# Patient Record
Sex: MALE | Race: WHITE | Employment: OTHER | URBAN - METROPOLITAN AREA
[De-identification: names, ages, dates, MRNs, and addresses within clinical notes are randomized per-mention and may not be internally consistent; named-entity substitution may affect disease eponyms.]

---

## 2017-01-11 DIAGNOSIS — N40.0 BPH (BENIGN PROSTATIC HYPERPLASIA): Primary | ICD-10-CM

## 2017-01-12 ENCOUNTER — OFFICE VISIT (OUTPATIENT)
Dept: UROLOGY | Facility: CLINIC | Age: 67
End: 2017-01-12
Payer: COMMERCIAL

## 2017-01-12 VITALS — HEART RATE: 63 BPM | BODY MASS INDEX: 31.98 KG/M2 | WEIGHT: 212 LBS | OXYGEN SATURATION: 97 %

## 2017-01-12 DIAGNOSIS — N40.1 BENIGN NODULAR PROSTATIC HYPERPLASIA WITH LOWER URINARY TRACT SYMPTOMS: ICD-10-CM

## 2017-01-12 LAB
ALBUMIN UR-MCNC: 30 MG/DL
APPEARANCE UR: CLEAR
BILIRUB UR QL STRIP: NEGATIVE
COLOR UR AUTO: YELLOW
GLUCOSE UR STRIP-MCNC: NEGATIVE MG/DL
HGB UR QL STRIP: ABNORMAL
KETONES UR STRIP-MCNC: NEGATIVE MG/DL
LEUKOCYTE ESTERASE UR QL STRIP: NEGATIVE
NITRATE UR QL: NEGATIVE
PH UR STRIP: 5.5 PH (ref 5–7)
PSA SERPL-MCNC: 2.4 NG/ML (ref 0–4)
SP GR UR STRIP: 1.01 (ref 1–1.03)
URN SPEC COLLECT METH UR: ABNORMAL
UROBILINOGEN UR STRIP-ACNC: 0.2 EU/DL (ref 0.2–1)

## 2017-01-12 PROCEDURE — 99213 OFFICE O/P EST LOW 20 MIN: CPT | Performed by: UROLOGY

## 2017-01-12 PROCEDURE — 36415 COLL VENOUS BLD VENIPUNCTURE: CPT | Performed by: UROLOGY

## 2017-01-12 PROCEDURE — 84153 ASSAY OF PSA TOTAL: CPT | Performed by: UROLOGY

## 2017-01-12 PROCEDURE — 81003 URINALYSIS AUTO W/O SCOPE: CPT | Performed by: UROLOGY

## 2017-01-12 ASSESSMENT — PAIN SCALES - GENERAL: PAINLEVEL: NO PAIN (0)

## 2017-01-12 NOTE — Clinical Note
1/12/2017      RE: Rhett Juares  [address] [facility] [address]       History: it is a very pleasant to see this very pleasant 66-year-old gentleman in follow-up consultation today.  He has a history of BPH with obstructive symptoms,type 1 diabetes,with some resultant neurovesical dysfunction with postvoid residuals in the range of 150 cc.  Once again today the residual volume is 148 cc.  He also has renal insufficiency and is being followed by her nephrologist.  He had required self intermittent catheterization in the past but no longer requires this  pSA in 2014 was 1.9,in 2015 1.6 in 2061.6 and in 2017,  2.4  He has no other major symptomatic complaints at this time.  His general health is otherwise satisfactory.    Past Medical History   Diagnosis Date     Hypertension      Diabetes mellitus (H)      CAD (coronary artery disease) 9/2012     NIECY to posterolateral branch     Pure hypercholesterolemia      Hyperlipidaemia      Lipoprotein deficiencies      S/P coronary artery stent placement 12/3/2014     Tremor 12/3/2014     Chronic kidney disease 7/6/2015     Gout        Social History     Social History     Marital Status:      Spouse Name: N/A     Number of Children: N/A     Years of Education: N/A     Social History Main Topics     Smoking status: Never Smoker      Smokeless tobacco: Never Used     Alcohol Use: 0.0 oz/week     0 Standard drinks or equivalent per week      Comment: occasional beer     Drug Use: No     Sexual Activity: Not Asked      Comment: not assessed     Other Topics Concern     Parent/Sibling W/ Cabg, Mi Or Angioplasty Before 65f 55m? 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            FH: family history includes Heart in his mother; Neurological in his father and father.       Past Surgical History   Procedure Laterality Date     Heart cath, angioplasty  9/14/12     second posterolateral branch off the dominant right coronary artery,       Family History   Problem Relation Age of Onset     HEART DISEASE Mother      Neurologic Disorder Father      Tremors at age of 70     Neurologic Disorder Father      Seizures         Current outpatient prescriptions:      co-enzyme Q-10 30 MG/5ML LIQD, Take by mouth daily, Disp: , Rfl:      atorvastatin (LIPITOR) 80 MG tablet, Take 1 tablet (80 mg) by mouth daily (Patient taking differently: Take 40 mg by mouth daily ), Disp: 90 tablet, Rfl: 3     venlafaxine (EFFEXOR-XR) 150 MG 24 hr capsule, Take 150 mg by mouth daily, Disp: , Rfl:      QUEtiapine (SEROQUEL) 100 MG tablet, Take 100 mg by mouth daily, Disp: , Rfl:      lisinopril (PRINIVIL,ZESTRIL) 20 MG tablet, Take 20 mg by mouth daily, Disp: , Rfl:      metoprolol (TOPROL-XL) 50 MG 24 hr tablet, Take 50 mg by mouth daily, Disp: , Rfl:      allopurinol (ZYLOPRIM) 100 MG tablet, Take 100 mg by mouth daily, Disp: , Rfl:      hydrochlorothiazide (HYDRODIURIL) 25 MG tablet, Take 1 tablet by mouth daily., Disp: 90 tablet, Rfl: 2     ASPIRIN PO, Take 81 mg by mouth daily , Disp: , Rfl:      CALCITRIOL PO, Take 0.25 mcg by mouth three times a week.  , Disp: , Rfl:      Cholecalciferol (VITAMIN D3 PO), Take 2,000 Units by mouth daily , Disp: , Rfl:     10 point ROS of systems including Constitutional, Eyes, Respiratory, Cardiovascular, Gastroenterology, Genitourinary, Integumentary, Muscularskeletal, Psychiatric were all negative except for pertinent positives noted in my HPI.    Examination:   Pulse 63  Wt 96.163 kg (212 lb)  SpO2 97%  General Impression: very pleasant gentleman in no acute  distress, well-oriented in time place and person  Mental Status: ormal.  HEENT.  There is no evidence of jaundice and mucous membranes are normal  Skin: the skin is normal to examination  Respiratory System: the respiratory cycle is normal  Lymph Nodes: not examined  Back/Flank Tenderness: there is no flank tenderness  Cardiovascular System: not examined  Abdominal Examination: not examined  Extremities: there is no significant peripheral edema  Genitial: not examined  Rectal Examination: ood sphincter tone, normal perianal sensation.  Smooth rectal mucosa without hemorrhoids or fissures.  Smooth softnormal size prostate without evidence of tenderness, bogginess or nodules.  Seminal vesicles.  Not palpable  Neurologic System: there are no new clinical focal abnormal neurological signs in the central or peripheral nervous systems    Impression: his overall situation is very stable.  The PSA is stable.  The clinical examination is satisfactory.  The postvoid residuals have been stable and he has few complaints.For this reason I do not see any indication for any change at this point.  i have recommended he contact me straightaway should he observe gross hematuria or any other significant deterioration in his symptoms.  i do explained and discussed the entire situation to the patient in detail today.  I answered all his questions    Plan: 1 year for PSA, bladder scan, symptom score and examination    Time: 0 minutes.  Greater than 50% was spent in discussion and consultation      Juan Carlos Banks MD

## 2017-01-12 NOTE — NURSING NOTE
Pt states no changes since he last saw you. No better and no worse.  OGT=[id number]  D. IZAIAH Alexander

## 2017-01-12 NOTE — PROGRESS NOTES
History: it is a very pleasant to see this very pleasant 66-year-old gentleman in follow-up consultation today.  He has a history of BPH with obstructive symptoms,type 1 diabetes,with some resultant neurovesical dysfunction with postvoid residuals in the range of 150 cc.  Once again today the residual volume is 148 cc.  He also has renal insufficiency and is being followed by her nephrologist.  He had required self intermittent catheterization in the past but no longer requires this  pSA in 2014 was 1.9,in 2015 1.6 in 2061.6 and in 2017,  2.4  He has no other major symptomatic complaints at this time.  His general health is otherwise satisfactory.    Past Medical History   Diagnosis Date     Hypertension      Diabetes mellitus (H)      CAD (coronary artery disease) 9/2012     NIECY to posterolateral branch     Pure hypercholesterolemia      Hyperlipidaemia      Lipoprotein deficiencies      S/P coronary artery stent placement 12/3/2014     Tremor 12/3/2014     Chronic kidney disease 7/6/2015     Gout        Social History     Social History     Marital Status:      Spouse Name: N/A     Number of Children: N/A     Years of Education: N/A     Social History Main Topics     Smoking status: Never Smoker      Smokeless tobacco: Never Used     Alcohol Use: 0.0 oz/week     0 Standard drinks or equivalent per week      Comment: occasional beer     Drug Use: No     Sexual Activity: Not Asked      Comment: not assessed     Other Topics Concern     Parent/Sibling W/ Cabg, Mi Or Angioplasty Before 65f 55m? 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            FH: family history includes Heart in his mother; Neurological in his father and father.       Past Surgical History   Procedure Laterality Date     Heart cath, angioplasty  9/14/12     second posterolateral branch off the dominant right coronary artery,       Family History   Problem Relation Age of Onset     HEART DISEASE Mother      Neurologic Disorder Father      Tremors at age of 70     Neurologic Disorder Father      Seizures         Current outpatient prescriptions:      co-enzyme Q-10 30 MG/5ML LIQD, Take by mouth daily, Disp: , Rfl:      atorvastatin (LIPITOR) 80 MG tablet, Take 1 tablet (80 mg) by mouth daily (Patient taking differently: Take 40 mg by mouth daily ), Disp: 90 tablet, Rfl: 3     venlafaxine (EFFEXOR-XR) 150 MG 24 hr capsule, Take 150 mg by mouth daily, Disp: , Rfl:      QUEtiapine (SEROQUEL) 100 MG tablet, Take 100 mg by mouth daily, Disp: , Rfl:      lisinopril (PRINIVIL,ZESTRIL) 20 MG tablet, Take 20 mg by mouth daily, Disp: , Rfl:      metoprolol (TOPROL-XL) 50 MG 24 hr tablet, Take 50 mg by mouth daily, Disp: , Rfl:      allopurinol (ZYLOPRIM) 100 MG tablet, Take 100 mg by mouth daily, Disp: , Rfl:      hydrochlorothiazide (HYDRODIURIL) 25 MG tablet, Take 1 tablet by mouth daily., Disp: 90 tablet, Rfl: 2     ASPIRIN PO, Take 81 mg by mouth daily , Disp: , Rfl:      CALCITRIOL PO, Take 0.25 mcg by mouth three times a week.  , Disp: , Rfl:      Cholecalciferol (VITAMIN D3 PO), Take 2,000 Units by mouth daily , Disp: , Rfl:     10 point ROS of systems including Constitutional, Eyes, Respiratory, Cardiovascular, Gastroenterology, Genitourinary, Integumentary, Muscularskeletal, Psychiatric were all negative except for pertinent positives noted in my HPI.    Examination:   Pulse 63  Wt 96.163 kg (212 lb)  SpO2 97%  General Impression: very pleasant gentleman in no acute distress, well-oriented in time place and person  Mental Status: ormal.  HEENT.  There is  no evidence of jaundice and mucous membranes are normal  Skin: the skin is normal to examination  Respiratory System: the respiratory cycle is normal  Lymph Nodes: not examined  Back/Flank Tenderness: there is no flank tenderness  Cardiovascular System: not examined  Abdominal Examination: not examined  Extremities: there is no significant peripheral edema  Genitial: not examined  Rectal Examination: ood sphincter tone, normal perianal sensation.  Smooth rectal mucosa without hemorrhoids or fissures.  Smooth softnormal size prostate without evidence of tenderness, bogginess or nodules.  Seminal vesicles.  Not palpable  Neurologic System: there are no new clinical focal abnormal neurological signs in the central or peripheral nervous systems    Impression: his overall situation is very stable.  The PSA is stable.  The clinical examination is satisfactory.  The postvoid residuals have been stable and he has few complaints.For this reason I do not see any indication for any change at this point.  i have recommended he contact me straightaway should he observe gross hematuria or any other significant deterioration in his symptoms.  i do explained and discussed the entire situation to the patient in detail today.  I answered all his questions    Plan: 1 year for PSA, bladder scan, symptom score and examination    Time: 0 minutes.  Greater than 50% was spent in discussion and consultation

## 2017-02-10 LAB
BUN SERPL-MCNC: 44 MG/DL (ref 7–25)
BUN/CREATININE RATIO (EXTERNAL): 21 (ref 6–22)
CALCIUM (EXTERNAL): 9.6 (ref 8.6–10.3)
CHLORIDE (EXTERNAL): 101 (ref 98–110)
CO2 SERPL-SCNC: 27 MMOL/L (ref 20–31)
CREATININE (EXTERNAL): 2.1 (ref 0.7–1.25)
EGFR CALCULATED (BLACK REFERENCE): 37 ML/MIN
EGFR CALCULATED (NON BLACK REFERENCE): 32 ML/MIN
GLUCOSE (EXTERNAL): 153 (ref 65–99)
GLUCOSE SERPL-MCNC: 139 MG/DL (ref 65–99)
HBA1C MFR BLD: 8.5 % (ref 4–6)
POTASSIUM (EXTERNAL): 4.7 (ref 3.5–5.3)
SODIUM (EXTERNAL): 138 (ref 135–146)

## 2017-02-21 NOTE — TELEPHONE ENCOUNTER
Received refill req for Lipitor 80 mg QD. This is what our med list states but there is a note next to it stating the pt is only taking 40 mg QD. Left msg for pt to call

## 2017-02-23 ENCOUNTER — TELEPHONE (OUTPATIENT)
Dept: CARDIOLOGY | Facility: CLINIC | Age: 67
End: 2017-02-23

## 2017-02-23 DIAGNOSIS — E78.5 HYPERLIPIDEMIA LDL GOAL <100: ICD-10-CM

## 2017-02-23 RX ORDER — ATORVASTATIN CALCIUM 80 MG/1
40 TABLET, FILM COATED ORAL DAILY
Qty: 45 TABLET | Refills: 2 | Status: SHIPPED | OUTPATIENT
Start: 2017-02-23 | End: 2017-12-22

## 2017-02-23 NOTE — TELEPHONE ENCOUNTER
Pt calling for refill on atorvastatin. Records show pt was originally on Crestor 40mg daily. Pt called Dr Florian's nurse on 1/24/16 because at that time he had been seen by Dr Florian. Crestor was no longer covered on his insurance. He was changed to Atorvastatin 80mg, but was to start on 40mg daily x 2 months and then recheck lipids and then increase to 80mg if tolerated. Pt never rechecked his lipids and per his report has stayed on the 40mg daily.     Pt has seen a different provider every year. This year for annual, pt saw Dr Ryan with [facility] on 10/24/16. Pt was instructed to continue the atorvastatin. Will review with Dr Ryan to see if he should stay on the 40mg daily. Last LDL was 35.

## 2017-02-23 NOTE — TELEPHONE ENCOUNTER
Lipid level, atorvastatin and follow up  Received: Today         Ajay Ryan MD Charles, Danielle, RN Hi Danielle     He should continue on 40 daily. Current  data does not suggest a downside, and possibly a benefit to LDL being as low as possible with documented CAD.     He is 's patient according to my note. He can get an appointment with Frederic... It is not crucial that it be exactly at one year intervals and as long as he is stable, can wait several months until Frederic is available.

## 2017-02-23 NOTE — TELEPHONE ENCOUNTER
Pt should stay on atorvastatin 40mg daily. Future follow-ups need to stay with Dr De La Garza for continuation of care.      Lipid level, atorvastatin and follow up  Received: Today       Ajay Ryan MD Charles, Danielle, RN Hi Danielle     He should continue on 40 daily. Current  data does not suggest a downside, and possibly a benefit to LDL being as low as possible with documented CAD.     He is 's patient according to my note. He can get an appointment with Frederic... It is not crucial that it be exactly at one year intervals and as long as he is stable, can wait several months until Frederic is available.

## 2017-03-09 ENCOUNTER — HOSPITAL ENCOUNTER (OUTPATIENT)
Dept: ULTRASOUND IMAGING | Facility: CLINIC | Age: 67
Discharge: HOME | End: 2017-03-09
Attending: INTERNAL MEDICINE | Admitting: INTERNAL MEDICINE
Payer: MEDICARE

## 2017-03-09 DIAGNOSIS — N18.9 CKD (CHRONIC KIDNEY DISEASE), UNSPECIFIED STAGE: ICD-10-CM

## 2017-03-09 PROCEDURE — 76770 US EXAM ABDO BACK WALL COMP: CPT

## 2017-08-08 LAB
ANGIOTEN CONV ENZYME: 7.7 IU/L (ref 4–6)
GLUCOSE SERPL-MCNC: 155 MG/DL (ref 65–99)
TSH SERPL-ACNC: 4.34 MCU/ML (ref 0.3–5)

## 2017-08-09 LAB
BUN/CREATININE RATIO (EXTERNAL): 25 (ref 6–22)
BUN/CREATININE RATIO (EXTERNAL): 47 (ref 7–25)
CALCIUM (EXTERNAL): 9.4 (ref 8.6–10.3)
CHLORIDE (EXTERNAL): 102 (ref 98–110)
CO2 SERPL-SCNC: 26 MMOL/L (ref 20–31)
CREAT SERPL-MCNC: 1.86 MG/DL (ref 0.7–1.25)
GFR SERPL CREATININE-BSD FRML MDRD: 37 ML/MIN/1.73M2
GLUCOSE SERPL-MCNC: 167 MG/DL (ref 65–99)
POTASSIUM (EXTERNAL): 4.6 (ref 3.5–5.3)
SODIUM SERPL-SCNC: 138 MMOL/L (ref 135–146)

## 2017-09-18 ENCOUNTER — TRANSFERRED RECORDS (OUTPATIENT)
Dept: HEALTH INFORMATION MANAGEMENT | Facility: CLINIC | Age: 67
End: 2017-09-18

## 2017-12-08 ENCOUNTER — TRANSFERRED RECORDS (OUTPATIENT)
Dept: HEALTH INFORMATION MANAGEMENT | Facility: CLINIC | Age: 67
End: 2017-12-08

## 2017-12-08 ENCOUNTER — PRE VISIT (OUTPATIENT)
Dept: CARDIOLOGY | Facility: CLINIC | Age: 67
End: 2017-12-08

## 2017-12-11 ENCOUNTER — DOCUMENTATION ONLY (OUTPATIENT)
Dept: CARDIOLOGY | Facility: CLINIC | Age: 67
End: 2017-12-11

## 2017-12-14 ENCOUNTER — OFFICE VISIT (OUTPATIENT)
Dept: CARDIOLOGY | Facility: CLINIC | Age: 67
End: 2017-12-14
Attending: INTERNAL MEDICINE
Payer: COMMERCIAL

## 2017-12-14 VITALS
BODY MASS INDEX: 31.98 KG/M2 | HEIGHT: 68 IN | DIASTOLIC BLOOD PRESSURE: 60 MMHG | SYSTOLIC BLOOD PRESSURE: 128 MMHG | WEIGHT: 211 LBS | HEART RATE: 62 BPM

## 2017-12-14 DIAGNOSIS — N18.30 STAGE 3 CHRONIC KIDNEY DISEASE (H): Primary | Chronic | ICD-10-CM

## 2017-12-14 DIAGNOSIS — I25.10 CORONARY ARTERY DISEASE INVOLVING NATIVE CORONARY ARTERY OF NATIVE HEART WITHOUT ANGINA PECTORIS: ICD-10-CM

## 2017-12-14 DIAGNOSIS — E78.2 MIXED HYPERLIPIDEMIA: ICD-10-CM

## 2017-12-14 DIAGNOSIS — E78.6 HDL DEFICIENCY: ICD-10-CM

## 2017-12-14 PROCEDURE — 99213 OFFICE O/P EST LOW 20 MIN: CPT | Performed by: INTERNAL MEDICINE

## 2017-12-14 RX ORDER — OMEGA-3-ACID ETHYL ESTERS 1 G/1
2 CAPSULE, LIQUID FILLED ORAL
COMMUNITY
End: 2018-01-30

## 2017-12-14 NOTE — MR AVS SNAPSHOT
"              After Visit Summary   12/14/2017    Rhett Juares    MRN: [id number]           Patient Information     Date Of Birth          1950        Visit Information        Provider Department      12/14/2017 10:00 AM Kd De La Garza MD [facility]        Today's Diagnoses     Pure hypercholesterolemia        Coronary artery disease involving native coronary artery of native heart without angina pectoris        Elevated blood pressure reading without diagnosis of hypertension           Follow-ups after your visit        Your next 10 appointments already scheduled     Jan 30, 2018  1:00 PM CST   Return Visit with Juan Carlos Banks MD   [facility] ([facility])    [address]  [address]  [facility] [place] [address] [phone]              Who to contact     If you have questions or need follow up information about today's clinic visit or your schedule please contact [facility] directly at [phone].  Normal or non-critical lab and imaging results will be communicated to you by Picovicohart, letter or phone within 4 business days after the clinic has received the results. If you do not hear from us within 7 days, please contact the clinic through [employer] or phone. If you have a critical or abnormal lab result, we will notify you by phone as soon as possible.  Submit refill requests through Fanvibe or call your pharmacy and they will forward the refill request to us. Please allow 3 business days for your refill to be completed.          Additional Information About Your Visit        Picovicohart Information     Fanvibe lets you send messages to your doctor, view your test results, renew your prescriptions, schedule appointments and more. To sign up, go to [email or web address] . Click on \"Log in\" on the left side of the screen, which will take you to the " "Welcome page. Then click on \"Sign up Now\" on the right side of the page.     You will be asked to enter the access code listed below, as well as some personal information. Please follow the directions to create your username and password.     Your access code is: [email or web address]  Expires: 3/14/2018 10:51 AM     Your access code will  in 90 days. If you need help or a new code, please call your [place] [facility] clinic or [phone].        Care EveryWhere ID     This is your Care EveryWhere ID. This could be used by other organizations to access your [facility] medical records  [id number]        Your Vitals Were     Pulse Height BMI (Body Mass Index)             62 1.734 m (5' 8.25\") 31.85 kg/m2          Blood Pressure from Last 3 Encounters:   17 128/60   10/24/16 140/56   10/13/15 124/76    Weight from Last 3 Encounters:   17 95.7 kg (211 lb)   17 96.2 kg (212 lb)   10/24/16 97.6 kg (215 lb 3.2 oz)              We Performed the Following     Follow-Up with Cardiologist          Today's Medication Changes          These changes are accurate as of: 17 10:51 AM.  If you have any questions, ask your nurse or doctor.               Stop taking these medicines if you haven't already. Please contact your care team if you have questions.     co-enzyme Q-10 30 MG/5ML Liqd liquid   Stopped by:  Kd De La Garza MD                    Primary Care Provider Office Phone # Fax #    Radha Kevin Joseph [phone]       [facility] [address] [place] [place] [address]        Equal Access to Services     [facility] AH: Hadvelasquez Linda, eben vidales, maged junealmarehana mccauley, amy amaral. So [facility] [phone].    ATENCIÓN: Si habla español, tiene a gonzalez disposición servicios gratuitos de asistencia lingüística. Llame al [phone].    We comply with applicable federal civil rights laws and [place] laws. We do not " discriminate on the basis of race, color, national origin, age, disability, sex, sexual orientation, or gender identity.            Thank you!     Thank you for choosing [facility]  for your care. Our goal is always to provide you with excellent care. Hearing back from our patients is one way we can continue to improve our services. Please take a few minutes to complete the written survey that you may receive in the mail after your visit with us. Thank you!             Your Updated Medication List - Protect others around you: Learn how to safely use, store and throw away your medicines at www.disposemymeds.org.          This list is accurate as of: 12/14/17 10:51 AM.  Always use your most recent med list.                   Brand Name Dispense Instructions for use Diagnosis    allopurinol 100 MG tablet    ZYLOPRIM     Take 100 mg by mouth daily        ASPIRIN PO      Take 81 mg by mouth daily        atorvastatin 80 MG tablet    LIPITOR    45 tablet    Take 0.5 tablets (40 mg) by mouth daily    Hyperlipidemia LDL goal <100       CALCITRIOL PO      Take 0.25 mcg by mouth three times a week.        hydrochlorothiazide 25 MG tablet    HYDRODIURIL    90 tablet    Take 1 tablet by mouth daily.    Unspecified essential hypertension       lisinopril 20 MG tablet    PRINIVIL/ZESTRIL     Take 20 mg by mouth daily        metoprolol 50 MG 24 hr tablet    TOPROL-XL     Take 50 mg by mouth daily        omega-3 acid ethyl esters 1 G capsule    Lovaza     Take 2 g by mouth Fish oil 1000 mg daily        QUEtiapine 100 MG tablet    SEROquel     Take 100 mg by mouth daily        venlafaxine 150 MG 24 hr capsule    EFFEXOR-XR     Take 150 mg by mouth daily        VITAMIN D3 PO      Take 2,000 Units by mouth daily

## 2017-12-14 NOTE — PROGRESS NOTES
HISTORY OF PRESENT ILLNESS:  Mr. Juares is a 67-year-old gentleman with a past medical history significant for juvenile onset diabetes mellitus since age 19, coronary artery disease, hypertension, mixed hyperlipidemia with HDL deficiency and hypertriglyceridemia and chronic renal insufficiency, stage III.      I had seen Mr. Juares in clinic once in 2012 at which time he was seen in followup for a stress echocardiogram that was positive with inferior wall ischemia and inducible nonsustained ventricular tachycardia.  He went on to cardiac catheterization where I placed a 2.25 x 22 mm drug-eluting stent in his second posterolateral branch.  This resulted in complete resolution of his symptoms.  He has done well ever since.  He has bounced around amongst multiple cardiologists over the years and now I am seeing him back for the first time in 5 years.      Rhett returns to clinic stating he is doing quite well.  He has no chest, arm, neck, jaw or shoulder discomfort.  No dyspnea on exertion, orthopnea or PND, no palpitations, lightheadedness, dizziness, syncope or near-syncope.  He notes no side effects or problems with his current medical regimen.  He states he goes to the club 4 times a week and does aerobic activity.  He states twice a week and will do some resistance activity and also goes to yoga class.  None of these cause him any problems.      He does follow with Dr. Cuba for his renal insufficiency.      ASSESSMENT AND PLAN:   Rhett appears to be doing quite well from a cardiac standpoint without clinical evidence of ischemia.  Last evaluation of his coronary anatomy was a stress echo performed in 10/2012 which was technically difficult study for which he only achieved 82% of his maximal heart rate due to being on beta blockers.  There is no evidence of ischemia at this workload.      He has no symptoms to suggest heart failure or significant arrhythmias.  There was no ventricular ectopy with a followup  stress echocardiogram.      I congratulated him on his exercise regimen and encouraged him to continue to do so.      Blood pressure is very well controlled at 128/60 with a pulse of 62.      Weight is 211 which does put him in the obese category with a body mass index of 31.9 with clothes on and I have encouraged him to try to lose some weight.  This will help with his diabetes, cholesterol profile and overall cardiovascular fitness.      Cholesterol profile was last checked a year ago and was outstanding.  Total cholesterol is 103, HDL is 37, LDL is 35, triglycerides are 155.  We are going to check a cholesterol profile.  He states he is due to follow up with Dr. Guajardo for his diabetes and I have recommended he get a fasting lipid profile at that time.      Last BMP was in August at which time his creatinine was 1.86, giving him a GFR of 37.  As stated, he follows closely with Dr. Cuba.      My recommendation is that he follow up with us in 1 year.  I will have him see my GINA.  We will check a fasting lipid profile and BMP if he has not had one done elsewhere.  If he does develop any chest, arm, neck, jaw or shoulder discomfort or palpitations that he needs to call us and we will evaluate with either stress testing or coronary angiography as appropriate.  Otherwise, we will continue to follow him on an annual basis.  I will continue his current medical regimen as is.      Thank you for allowing me to participate in his care.         AMARILYS CAMPOS MD, [facility]             D: 2017 11:41   T: 2017 12:12   MT: [id number]      Name:     TIFFANY LUCEOR   MRN:      [id number]        Account:      [id number]   :      1950           Service Date: 2017      Document: [id number]

## 2017-12-14 NOTE — PROGRESS NOTES
HPI and Plan:   See dictation    Orders Placed This Encounter   Procedures     Basic metabolic panel     Lipid Profile     Follow-Up with Cardiac Advanced Practice Provider     Follow-Up with Cardiologist       Orders Placed This Encounter   Medications     omega-3 acid ethyl esters (LOVAZA) 1 G capsule     Sig: Take 2 g by mouth Fish oil 1000 mg daily       Medications Discontinued During This Encounter   Medication Reason     co-enzyme Q-10 30 MG/5ML LIQD          Encounter Diagnoses   Name Primary?     Coronary artery disease involving native coronary artery of native heart without angina pectoris      Stage 3 chronic kidney disease Yes     HDL deficiency      Mixed hyperlipidemia        CURRENT MEDICATIONS:  Current Outpatient Prescriptions   Medication Sig Dispense Refill     omega-3 acid ethyl esters (LOVAZA) 1 G capsule Take 2 g by mouth Fish oil 1000 mg daily       atorvastatin (LIPITOR) 80 MG tablet Take 0.5 tablets (40 mg) by mouth daily 45 tablet 2     venlafaxine (EFFEXOR-XR) 150 MG 24 hr capsule Take 150 mg by mouth daily       QUEtiapine (SEROQUEL) 100 MG tablet Take 100 mg by mouth daily       lisinopril (PRINIVIL,ZESTRIL) 20 MG tablet Take 20 mg by mouth daily       metoprolol (TOPROL-XL) 50 MG 24 hr tablet Take 50 mg by mouth daily       allopurinol (ZYLOPRIM) 100 MG tablet Take 100 mg by mouth daily       hydrochlorothiazide (HYDRODIURIL) 25 MG tablet Take 1 tablet by mouth daily. 90 tablet 2     ASPIRIN PO Take 81 mg by mouth daily        CALCITRIOL PO Take 0.25 mcg by mouth three times a week.         Cholecalciferol (VITAMIN D3 PO) Take 2,000 Units by mouth daily          ALLERGIES     Allergies   Allergen Reactions     Carbidopa-Levodopa Nausea and Vomiting     Penicillin G        PAST MEDICAL HISTORY:  Past Medical History:   Diagnosis Date     Chronic kidney disease 07/06/2015    managed by Dr. Culver     Coronary artery disease involving native coronary artery of native heart without angina  pectoris 09/2012    2012 - NIECY to posterolateral branch      Diabetes mellitus (H)     insulin pump, managed by Dr. Bennett Ortiz      Hypertension      Lipoprotein deficiencies      Parkinsonism (H) 12/4/2014     Pure hypercholesterolemia      S/P coronary artery stent placement 12/3/2014     Tremor 12/3/2014       PAST SURGICAL HISTORY:  Past Surgical History:   Procedure Laterality Date     HEART CATH, ANGIOPLASTY  9/14/12    second posterolateral branch off the dominant right coronary artery,       FAMILY HISTORY:  Family History   Problem Relation Age of Onset     HEART DISEASE Mother      Neurologic Disorder Father      Tremors at age of 70/Seizures       SOCIAL HISTORY:  Social History     Social History     Marital status:      Spouse name: N/A     Number of children: N/A     Years of education: N/A     Social History Main Topics     Smoking status: Never Smoker     Smokeless tobacco: Never Used     Alcohol use 0.0 oz/week     0 Standard drinks or equivalent per week      Comment: occasional beer     Drug use: No     Sexual activity: Not Asked      Comment: not assessed     Other Topics Concern     Parent/Sibling W/ Cabg, Mi Or Angioplasty Before 65f 55m? 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            FH: family history includes Heart in his mother; Neurological in his father and father.       Review of Systems:  Skin:  Negative for       Eyes:  Positive for glasses    ENT:  Negative for      Respiratory:  Positive for sleep apnea;CPAP     Cardiovascular:    Positive for left ankle - always has  "edema  Gastroenterology: Positive for heartburn;constipation    Genitourinary:  not assessed      Musculoskeletal:  Positive for back pain    Neurologic:    numbness or tingling of hands    Psychiatric:  Negative for      Heme/Lymph/Imm:  Negative for      Endocrine:  Positive for diabetes      Physical Exam:  Vitals: /60  Pulse 62  Ht 1.734 m (5' 8.25\")  Wt 95.7 kg (211 lb)  BMI 31.85 kg/m2    Constitutional:  cooperative, alert and oriented, well developed, well nourished, in no acute distress overweight      Skin:  warm and dry to the touch, no apparent skin lesions or masses noted          Head:  normocephalic, no masses or lesions        Eyes:  pupils equal and round, conjunctivae and lids unremarkable, sclera white, no xanthalasma, EOMS intact, no nystagmus        Lymph:      ENT:  no pallor or cyanosis, dentition good        Neck:           Respiratory:  normal breath sounds, clear to auscultation, normal A-P diameter, normal symmetry, normal respiratory excursion, no use of accessory muscles         Cardiac: regular rhythm, normal S1/S2, no S3 or S4, apical impulse not displaced, no murmurs, gallops or rubs                pulses full and equal, no bruits auscultated                                        GI:  abdomen soft, non-tender, BS normoactive, no mass, no HSM, no bruits        Extremities and Muscular Skeletal:  no edema;no spinal abnormalities noted;normal muscle strength and tone              Neurological:  no gross motor deficits        Psych:  affect appropriate, oriented to time, person and place        CC  Ajay Ryan MD  [address]  [place] [address]              "

## 2017-12-14 NOTE — LETTER
12/14/2017    Radha Joseph NP  [facility] [address] [place] [address]      RE: Rhett Juares       Dear Colleague,    I had the pleasure of seeing Rhett Juares in the [facility].    HISTORY OF PRESENT ILLNESS:  Mr. Juares is a 67-year-old gentleman with a past medical history significant for juvenile onset diabetes mellitus since age 19, coronary artery disease, hypertension, mixed hyperlipidemia with HDL deficiency and hypertriglyceridemia and chronic renal insufficiency, stage III.      I had seen Mr. Juares in clinic once in 2012 at which time he was seen in followup for a stress echocardiogram that was positive with inferior wall ischemia and inducible nonsustained ventricular tachycardia.  He went on to cardiac catheterization where I placed a 2.25 x 22 mm drug-eluting stent in his second posterolateral branch.  This resulted in complete resolution of his symptoms.  He has done well ever since.  He has bounced around amongst multiple cardiologists over the years and now I am seeing him back for the first time in 5 years.      Rhett returns to clinic stating he is doing quite well.  He has no chest, arm, neck, jaw or shoulder discomfort.  No dyspnea on exertion, orthopnea or PND, no palpitations, lightheadedness, dizziness, syncope or near-syncope.  He notes no side effects or problems with his current medical regimen.  He states he goes to the club 4 times a week and does aerobic activity.  He states twice a week and will do some resistance activity and also goes to yoga class.  None of these cause him any problems.      He does follow with Dr. Cuba for his renal insufficiency.      ASSESSMENT AND PLAN:   Rhett appears to be doing quite well from a cardiac standpoint without clinical evidence of ischemia.  Last evaluation of his coronary anatomy was a stress echo performed in 10/2012 which was technically  difficult study for which he only achieved 82% of his maximal heart rate due to being on beta blockers.  There is no evidence of ischemia at this workload.      He has no symptoms to suggest heart failure or significant arrhythmias.  There was no ventricular ectopy with a followup stress echocardiogram.      I congratulated him on his exercise regimen and encouraged him to continue to do so.      Blood pressure is very well controlled at 128/60 with a pulse of 62.      Weight is 211 which does put him in the obese category with a body mass index of 31.9 with clothes on and I have encouraged him to try to lose some weight.  This will help with his diabetes, cholesterol profile and overall cardiovascular fitness.      Cholesterol profile was last checked a year ago and was outstanding.  Total cholesterol is 103, HDL is 37, LDL is 35, triglycerides are 155.  We are going to check a cholesterol profile.  He states he is due to follow up with Dr. Guajardo for his diabetes and I have recommended he get a fasting lipid profile at that time.      Last BMP was in August at which time his creatinine was 1.86, giving him a GFR of 37.  As stated, he follows closely with Dr. Cuba.      My recommendation is that he follow up with us in 1 year.  I will have him see my GINA.  We will check a fasting lipid profile and BMP if he has not had one done elsewhere.  If he does develop any chest, arm, neck, jaw or shoulder discomfort or palpitations that he needs to call us and we will evaluate with either stress testing or coronary angiography as appropriate.  Otherwise, we will continue to follow him on an annual basis.  I will continue his current medical regimen as is.      Thank you for allowing me to participate in his care.       Outpatient Encounter Prescriptions as of 12/14/2017   Medication Sig Dispense Refill     omega-3 acid ethyl esters (LOVAZA) 1 G capsule Take 2 g by mouth Fish oil 1000 mg daily       atorvastatin (LIPITOR)  80 MG tablet Take 0.5 tablets (40 mg) by mouth daily 45 tablet 2     venlafaxine (EFFEXOR-XR) 150 MG 24 hr capsule Take 150 mg by mouth daily       QUEtiapine (SEROQUEL) 100 MG tablet Take 100 mg by mouth daily       lisinopril (PRINIVIL,ZESTRIL) 20 MG tablet Take 20 mg by mouth daily       metoprolol (TOPROL-XL) 50 MG 24 hr tablet Take 50 mg by mouth daily       allopurinol (ZYLOPRIM) 100 MG tablet Take 100 mg by mouth daily       hydrochlorothiazide (HYDRODIURIL) 25 MG tablet Take 1 tablet by mouth daily. 90 tablet 2     ASPIRIN PO Take 81 mg by mouth daily        CALCITRIOL PO Take 0.25 mcg by mouth three times a week.         Cholecalciferol (VITAMIN D3 PO) Take 2,000 Units by mouth daily        [DISCONTINUED] co-enzyme Q-10 30 MG/5ML LIQD Take by mouth daily       No facility-administered encounter medications on file as of 12/14/2017.        Again, thank you for allowing me to participate in the care of your patient.      Sincerely,    Kd De La Garza MD     [facility]

## 2017-12-22 DIAGNOSIS — E78.5 HYPERLIPIDEMIA LDL GOAL <100: ICD-10-CM

## 2017-12-22 RX ORDER — ATORVASTATIN CALCIUM 80 MG/1
40 TABLET, FILM COATED ORAL DAILY
Qty: 45 TABLET | Refills: 3 | Status: SHIPPED | OUTPATIENT
Start: 2017-12-22 | End: 2018-12-12

## 2018-01-30 ENCOUNTER — OFFICE VISIT (OUTPATIENT)
Dept: UROLOGY | Facility: CLINIC | Age: 68
End: 2018-01-30
Payer: COMMERCIAL

## 2018-01-30 VITALS
DIASTOLIC BLOOD PRESSURE: 60 MMHG | BODY MASS INDEX: 31.07 KG/M2 | HEART RATE: 59 BPM | SYSTOLIC BLOOD PRESSURE: 130 MMHG | HEIGHT: 68 IN | OXYGEN SATURATION: 99 % | WEIGHT: 205 LBS

## 2018-01-30 DIAGNOSIS — N40.1 BPH WITH URINARY OBSTRUCTION: Primary | ICD-10-CM

## 2018-01-30 DIAGNOSIS — N13.8 BPH WITH URINARY OBSTRUCTION: Primary | ICD-10-CM

## 2018-01-30 DIAGNOSIS — N40.1 BENIGN NODULAR PROSTATIC HYPERPLASIA WITH LOWER URINARY TRACT SYMPTOMS: ICD-10-CM

## 2018-01-30 LAB
ALBUMIN UR-MCNC: ABNORMAL MG/DL
APPEARANCE UR: CLEAR
BILIRUB UR QL STRIP: NEGATIVE
COLOR UR AUTO: YELLOW
GLUCOSE UR STRIP-MCNC: 250 MG/DL
HGB UR QL STRIP: NEGATIVE
KETONES UR STRIP-MCNC: NEGATIVE MG/DL
LEUKOCYTE ESTERASE UR QL STRIP: NEGATIVE
NITRATE UR QL: NEGATIVE
PH UR STRIP: 6.5 PH (ref 5–7)
PSA SERPL-MCNC: 2.6 NG/ML (ref 0–4)
RESIDUAL VOLUME (RV) (EXTERNAL): 211
SOURCE: ABNORMAL
SP GR UR STRIP: 1.01 (ref 1–1.03)
UROBILINOGEN UR STRIP-ACNC: 0.2 EU/DL (ref 0.2–1)

## 2018-01-30 PROCEDURE — 36415 COLL VENOUS BLD VENIPUNCTURE: CPT | Performed by: UROLOGY

## 2018-01-30 PROCEDURE — 51798 US URINE CAPACITY MEASURE: CPT | Performed by: UROLOGY

## 2018-01-30 PROCEDURE — 99213 OFFICE O/P EST LOW 20 MIN: CPT | Mod: 25 | Performed by: UROLOGY

## 2018-01-30 PROCEDURE — 81003 URINALYSIS AUTO W/O SCOPE: CPT | Performed by: UROLOGY

## 2018-01-30 PROCEDURE — 84153 ASSAY OF PSA TOTAL: CPT | Performed by: UROLOGY

## 2018-01-30 RX ORDER — CIDER VINEGAR 300 MG
1 TABLET ORAL
COMMUNITY

## 2018-01-30 ASSESSMENT — PAIN SCALES - GENERAL: PAINLEVEL: NO PAIN (0)

## 2018-01-30 NOTE — MR AVS SNAPSHOT
"              After Visit Summary   1/30/2018    Rhett Juares    MRN: [id number]           Patient Information     Date Of Birth          1950        Visit Information        Provider Department      1/30/2018 1:00 PM Juan Carlos Banks MD [facility]        Today's Diagnoses     Benign nodular prostatic hyperplasia with lower urinary tract symptoms           Follow-ups after your visit        Follow-up notes from your care team     Return if symptoms worsen or fail to improve.      Future tests that were ordered for you today     Open Future Orders        Priority Expected Expires Ordered    MEASURE POST-VOID RESIDUAL URINE/BLADDER CAPACITY, US NON-IMAGING (51973) Routine  1/30/2019 1/30/2018            Who to contact     If you have questions or need follow up information about today's clinic visit or your schedule please contact [facility]OLOG[facility] directly at [phone].  Normal or non-critical lab and imaging results will be communicated to you by MyChart, letter or phone within 4 business days after the clinic has received the results. If you do not hear from us within 7 days, please contact the clinic through MyChart or phone. If you have a critical or abnormal lab result, we will notify you by phone as soon as possible.  Submit refill requests through "[employer] or call your pharmacy and they will forward the refill request to us. Please allow 3 business days for your refill to be completed.          Additional Information About Your Visit        MyChart Information     "[employer] lets you send messages to your doctor, view your test results, renew your prescriptions, schedule appointments and more. To sign up, go to [email or web address] . Click on \"Log in\" on the left side of the screen, which will take you to the Welcome page. Then click on \"Sign up Now\" on the right side of the page.     You will be asked to enter " "the access code listed below, as well as some personal information. Please follow the directions to create your username and password.     Your access code is: [email or web address]  Expires: 3/14/2018 10:51 AM     Your access code will  in 90 days. If you need help or a new code, please call your [facility] clinic or [phone].        Care EveryWhere ID     This is your Care EveryWhere ID. This could be used by other organizations to access your [facility] medical records  [id number]        Your Vitals Were     Pulse Height Pulse Oximetry BMI (Body Mass Index)          59 1.727 m (5' 8\") 99% 31.17 kg/m2         Blood Pressure from Last 3 Encounters:   18 130/60   17 128/60   10/24/16 140/56    Weight from Last 3 Encounters:   18 93 kg (205 lb)   17 95.7 kg (211 lb)   17 96.2 kg (212 lb)              We Performed the Following     MEASURE POST-VOID RESIDUAL URINE/BLADDER CAPACITY, US NON-IMAGING (05775)     PSA Diag Urologic Phys [[id number]]     UA without Microscopic        Primary Care Provider Office Phone # Fax #    Radha Joseph [phone]       [place] [facility] [address] [place] [address]        Equal Access to Services     AMANDA ROJO : Hadii aad ku hadasho Soomaali, waaxda luqadaha, qaybta kaalmada adeegyada, amy amaral. So [facility] [phone].    ATENCIÓN: Si habla español, tiene a gonzalez disposición servicios gratuitos de asistencia lingüística. Lucia al [phone].    We comply with applicable federal civil rights laws and [place] laws. We do not discriminate on the basis of race, color, national origin, age, disability, sex, sexual orientation, or gender identity.            Thank you!     Thank you for choosing [facility]  for your care. Our goal is always to provide you with excellent care. Hearing back from our patients is one way we can continue to " improve our services. Please take a few minutes to complete the written survey that you may receive in the mail after your visit with us. Thank you!             Your Updated Medication List - Protect others around you: Learn how to safely use, store and throw away your medicines at www.disposemymeds.org.          This list is accurate as of 1/30/18  1:40 PM.  Always use your most recent med list.                   Brand Name Dispense Instructions for use Diagnosis    allopurinol 100 MG tablet    ZYLOPRIM     Take 100 mg by mouth daily        ASPIRIN PO      Take 81 mg by mouth daily        atorvastatin 80 MG tablet    LIPITOR    45 tablet    Take 0.5 tablets (40 mg) by mouth daily    Hyperlipidemia LDL goal <100       CALCITRIOL PO      Take 0.25 mcg by mouth three times a week.        Fish Oil 1000 MG Cpdr           hydrochlorothiazide 25 MG tablet    HYDRODIURIL    90 tablet    Take 1 tablet by mouth daily.    Unspecified essential hypertension       lisinopril 20 MG tablet    PRINIVIL/ZESTRIL     Take 20 mg by mouth daily        metoprolol succinate 50 MG 24 hr tablet    TOPROL-XL     Take 50 mg by mouth daily        QUEtiapine 100 MG tablet    SEROquel     Take 100 mg by mouth daily        venlafaxine 150 MG 24 hr capsule    EFFEXOR-XR     Take 150 mg by mouth daily        VITAMIN D3 PO      Take 2,000 Units by mouth daily

## 2018-01-30 NOTE — LETTER
1/30/2018       RE: Rhett Juares  [address] [address] [address]     Dear Colleague,    Thank you for referring your patient, Rhett Juares, to the [facility] [place] [facility]. Please see a copy of my visit note below.    History: It is a great pleasure to see this very pleasant 67-year-old gentleman in follow-up consultation today.  As we recall the has a history of type 1 diabetes, mild renal insufficiency, coronary artery disease and mild lower urinary tract symptoms.  Symptoms: There is 14.  Postvoid residual today is 231 cc.  However he has no major complaints at this time apart from double voiding first thing in the morning.  As been no history of urinary tract infection and no evidence of gross hematuria.  Renal functions remained stable over the last 5 years    Past Medical History:   Diagnosis Date     Chronic kidney disease 07/06/2015    managed by Dr. Culver     Coronary artery disease involving native coronary artery of native heart without angina pectoris 09/2012 2012 - NIECY to posterolateral branch      Diabetes mellitus (H)     insulin pump, managed by Dr. Guajardo     Gout      Hypertension      Lipoprotein deficiencies      Parkinsonism (H) 12/4/2014     Pure hypercholesterolemia      S/P coronary artery stent placement 12/3/2014     Tremor 12/3/2014       Social History     Social History     Marital status:      Spouse name: N/A     Number of children: N/A     Years of education: N/A     Social History Main Topics     Smoking status: Never Smoker     Smokeless tobacco: Never Used     Alcohol use 0.0 oz/week     0 Standard drinks or equivalent per week      Comment: occasional beer     Drug use: No     Sexual activity: Not Asked      Comment: not assessed     Other Topics Concern     Parent/Sibling W/ Cabg, Mi Or Angioplasty Before 65f 55m? 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            FH: family history includes Heart in his mother; Neurological in his father and father.       Past Surgical History:   Procedure Laterality Date     HEART CATH, ANGIOPLASTY  9/14/12    second posterolateral branch off the dominant right coronary artery,       Family History   Problem Relation Age of Onset     HEART DISEASE Mother      Neurologic Disorder Father      Tremors at age of 70/Seizures         Current Outpatient Prescriptions:      Omega-3 Fatty Acids (FISH OIL) 1000 MG CPDR, , Disp: , Rfl:      atorvastatin (LIPITOR) 80 MG tablet, Take 0.5 tablets (40 mg) by mouth daily, Disp: 45 tablet, Rfl: 3     venlafaxine (EFFEXOR-XR) 150 MG 24 hr capsule, Take 150 mg by mouth daily, Disp: , Rfl:      QUEtiapine (SEROQUEL) 100 MG tablet, Take 100 mg by mouth daily, Disp: , Rfl:      lisinopril (PRINIVIL,ZESTRIL) 20 MG tablet, Take 20 mg by mouth daily, Disp: , Rfl:      metoprolol (TOPROL-XL) 50 MG 24 hr tablet, Take 50 mg by mouth daily, Disp: , Rfl:      allopurinol (ZYLOPRIM) 100 MG tablet, Take 100 mg by mouth daily, Disp: , Rfl:      hydrochlorothiazide (HYDRODIURIL) 25 MG tablet, Take 1 tablet by mouth daily., Disp: 90 tablet, Rfl: 2     ASPIRIN PO, Take 81 mg by mouth daily , Disp: , Rfl:      CALCITRIOL PO, Take 0.25 mcg by mouth three times a week.  , Disp: , Rfl:      Cholecalciferol (VITAMIN D3 PO), Take 2,000 Units by mouth daily , Disp: , Rfl:     10 point ROS of systems including Constitutional, Eyes, Respiratory, Cardiovascular, Gastroenterology, Genitourinary, Integumentary, Muscularskeletal,  "Psychiatric were all negative except for pertinent positives noted in my HPI.    Examination:   /60  Pulse 59  Ht 1.727 m (5' 8\")  Wt 93 kg (205 lb)  SpO2 99%  BMI 31.17 kg/m2  General Impression: Very pleasant gentleman in no acute distress, well-oriented in time place and person  Mental Status: Normal.  HEENT.  There is no clinical evidence of jaundice and the mucous membranes are normal  Skin: His skin is otherwise normal to examination  Respiratory System: The respiratory cycle is normal  Lymph Nodes: Not examined  Back/Flank Tenderness: There is no flank tenderness  Cardiovascular System: Not examined  Abdominal Examination: Not examined  Extremities: There is no peripheral edema  Genitial: Not examined  Rectal Examination: Good sphincter tone, normal perianal sensation.  Smooth rectal mucosa without hemorrhoids or fissures.  Smooth soft slightly enlarged prostate without evidence of tenderness, bogginess or nodules.  Seminal vesicles.  Not palpable.  Perineum is normal to examination  Neurologic System: There are no focal clinical abnormal neurological signs in the central, or peripheral nervous systems    Impression: The PSA today is 2.6 which is stable and well within the normal range.  Clinical examination the prostate is quite benign.  His symptom score today is 14 although his symptoms seem quite mild and he is mostly satisfied.  There is no evidence of infection.  At this point I do not need to see him regularly and I would recommend follow-up by his personal physician with an annual PSA, digital rectal examination and review of symptoms.  However I wish to see him promptly to any of the following issues occurred  1.  PSA rises above 5.0.  2.  Gross hematuria.  3.  Urinary tract infection  4.  Significant deterioration in his urinary symptoms.    I did carefully discussed the situation with the patient in detail today.  I answered all his questions    Plan: I will see him on a p.r.n. basis.  " "He'll be followed up by his personal physician and I will see him promptly should any of the above listed situations arise    Time: 20 minutes and greater than 50% in discussion and consultation    \"This dictation was performed with voice recognition software and may contain errors,  omissions and inadvertent word substitution.\"            Again, thank you for allowing me to participate in the care of your patient.      Sincerely,    Juan Carlos Banks MD      "

## 2018-01-30 NOTE — PROGRESS NOTES
History: It is a great pleasure to see this very pleasant 67-year-old gentleman in follow-up consultation today.  As we recall the has a history of type 1 diabetes, mild renal insufficiency, coronary artery disease and mild lower urinary tract symptoms.  Symptoms: There is 14.  Postvoid residual today is 231 cc.  However he has no major complaints at this time apart from double voiding first thing in the morning.  As been no history of urinary tract infection and no evidence of gross hematuria.  Renal functions remained stable over the last 5 years    Past Medical History:   Diagnosis Date     Chronic kidney disease 07/06/2015    managed by Dr. Culver     Coronary artery disease involving native coronary artery of native heart without angina pectoris 09/2012 2012 - NIECY to posterolateral branch      Diabetes mellitus (H)     insulin pump, managed by Dr. Bennett Ortiz      Hypertension      Lipoprotein deficiencies      Parkinsonism (H) 12/4/2014     Pure hypercholesterolemia      S/P coronary artery stent placement 12/3/2014     Tremor 12/3/2014       Social History     Social History     Marital status:      Spouse name: N/A     Number of children: N/A     Years of education: N/A     Social History Main Topics     Smoking status: Never Smoker     Smokeless tobacco: Never Used     Alcohol use 0.0 oz/week     0 Standard drinks or equivalent per week      Comment: occasional beer     Drug use: No     Sexual activity: Not Asked      Comment: not assessed     Other Topics Concern     Parent/Sibling W/ Cabg, Mi Or Angioplasty Before 65f 55m? 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            FH: family history includes Heart in his mother; Neurological in his father and father.       Past Surgical History:   Procedure Laterality Date     HEART CATH, ANGIOPLASTY  9/14/12    second posterolateral branch off the dominant right coronary artery,       Family History   Problem Relation Age of Onset     HEART DISEASE Mother      Neurologic Disorder Father      Tremors at age of 70/Seizures         Current Outpatient Prescriptions:      Omega-3 Fatty Acids (FISH OIL) 1000 MG CPDR, , Disp: , Rfl:      atorvastatin (LIPITOR) 80 MG tablet, Take 0.5 tablets (40 mg) by mouth daily, Disp: 45 tablet, Rfl: 3     venlafaxine (EFFEXOR-XR) 150 MG 24 hr capsule, Take 150 mg by mouth daily, Disp: , Rfl:      QUEtiapine (SEROQUEL) 100 MG tablet, Take 100 mg by mouth daily, Disp: , Rfl:      lisinopril (PRINIVIL,ZESTRIL) 20 MG tablet, Take 20 mg by mouth daily, Disp: , Rfl:      metoprolol (TOPROL-XL) 50 MG 24 hr tablet, Take 50 mg by mouth daily, Disp: , Rfl:      allopurinol (ZYLOPRIM) 100 MG tablet, Take 100 mg by mouth daily, Disp: , Rfl:      hydrochlorothiazide (HYDRODIURIL) 25 MG tablet, Take 1 tablet by mouth daily., Disp: 90 tablet, Rfl: 2     ASPIRIN PO, Take 81 mg by mouth daily , Disp: , Rfl:      CALCITRIOL PO, Take 0.25 mcg by mouth three times a week.  , Disp: , Rfl:      Cholecalciferol (VITAMIN D3 PO), Take 2,000 Units by mouth daily , Disp: , Rfl:     10 point ROS of systems including Constitutional, Eyes, Respiratory, Cardiovascular, Gastroenterology, Genitourinary, Integumentary, Muscularskeletal, Psychiatric were all negative except for pertinent positives noted in my HPI.    Examination:   /60  Pulse 59  Ht 1.727 m (5' 8\")  Wt 93 kg (205 lb)  SpO2 99%  BMI 31.17 kg/m2  General Impression: Very pleasant gentleman in no acute distress, well-oriented in time place and person  Mental Status: Normal.  HEENT.  There is no " "clinical evidence of jaundice and the mucous membranes are normal  Skin: His skin is otherwise normal to examination  Respiratory System: The respiratory cycle is normal  Lymph Nodes: Not examined  Back/Flank Tenderness: There is no flank tenderness  Cardiovascular System: Not examined  Abdominal Examination: Not examined  Extremities: There is no peripheral edema  Genitial: Not examined  Rectal Examination: Good sphincter tone, normal perianal sensation.  Smooth rectal mucosa without hemorrhoids or fissures.  Smooth soft slightly enlarged prostate without evidence of tenderness, bogginess or nodules.  Seminal vesicles.  Not palpable.  Perineum is normal to examination  Neurologic System: There are no focal clinical abnormal neurological signs in the central, or peripheral nervous systems    Impression: The PSA today is 2.6 which is stable and well within the normal range.  Clinical examination the prostate is quite benign.  His symptom score today is 14 although his symptoms seem quite mild and he is mostly satisfied.  There is no evidence of infection.  At this point I do not need to see him regularly and I would recommend follow-up by his personal physician with an annual PSA, digital rectal examination and review of symptoms.  However I wish to see him promptly to any of the following issues occurred  1.  PSA rises above 5.0.  2.  Gross hematuria.  3.  Urinary tract infection  4.  Significant deterioration in his urinary symptoms.    I did carefully discussed the situation with the patient in detail today.  I answered all his questions    Plan: I will see him on a p.r.n. basis.  He'll be followed up by his personal physician and I will see him promptly should any of the above listed situations arise    Time: 20 minutes and greater than 50% in discussion and consultation    \"This dictation was performed with voice recognition software and may contain errors,  omissions and inadvertent word " "substitution.\"          "

## 2018-08-09 ENCOUNTER — TRANSFERRED RECORDS (OUTPATIENT)
Dept: HEALTH INFORMATION MANAGEMENT | Facility: CLINIC | Age: 68
End: 2018-08-09

## 2018-10-01 ENCOUNTER — TRANSFERRED RECORDS (OUTPATIENT)
Dept: HEALTH INFORMATION MANAGEMENT | Facility: CLINIC | Age: 68
End: 2018-10-01

## 2018-10-02 ENCOUNTER — HOSPITAL ENCOUNTER (EMERGENCY)
Facility: CLINIC | Age: 68
Discharge: HOME | End: 2018-10-02
Attending: EMERGENCY MEDICINE | Admitting: EMERGENCY MEDICINE
Payer: MEDICARE

## 2018-10-02 VITALS
TEMPERATURE: 98.1 F | RESPIRATION RATE: 20 BRPM | HEART RATE: 70 BPM | DIASTOLIC BLOOD PRESSURE: 79 MMHG | OXYGEN SATURATION: 100 % | SYSTOLIC BLOOD PRESSURE: 134 MMHG

## 2018-10-02 DIAGNOSIS — K92.0 HEMATEMESIS WITH NAUSEA: ICD-10-CM

## 2018-10-02 LAB
ALBUMIN SERPL-MCNC: 3.7 G/DL (ref 3.4–5)
ALP SERPL-CCNC: 118 U/L (ref 40–150)
ALT SERPL W P-5'-P-CCNC: 20 U/L (ref 0–70)
ANION GAP SERPL CALCULATED.3IONS-SCNC: 7 MMOL/L (ref 3–14)
AST SERPL W P-5'-P-CCNC: 15 U/L (ref 0–45)
BASOPHILS # BLD AUTO: 0 10E9/L (ref 0–0.2)
BASOPHILS NFR BLD AUTO: 0.3 %
BILIRUB SERPL-MCNC: 0.5 MG/DL (ref 0.2–1.3)
BUN SERPL-MCNC: 41 MG/DL (ref 7–30)
CALCIUM SERPL-MCNC: 9.2 MG/DL (ref 8.5–10.1)
CHLORIDE SERPL-SCNC: 102 MMOL/L (ref 94–109)
CO2 SERPL-SCNC: 27 MMOL/L (ref 20–32)
CREAT SERPL-MCNC: 1.82 MG/DL (ref 0.66–1.25)
DIFFERENTIAL METHOD BLD: ABNORMAL
EOSINOPHIL # BLD AUTO: 0 10E9/L (ref 0–0.7)
EOSINOPHIL NFR BLD AUTO: 0.1 %
ERYTHROCYTE [DISTWIDTH] IN BLOOD BY AUTOMATED COUNT: 15.2 % (ref 10–15)
GFR SERPL CREATININE-BSD FRML MDRD: 37 ML/MIN/1.7M2
GLUCOSE BLDC GLUCOMTR-MCNC: 174 MG/DL (ref 70–99)
GLUCOSE SERPL-MCNC: 205 MG/DL (ref 70–99)
HCT VFR BLD AUTO: 46.1 % (ref 40–53)
HGB BLD-MCNC: 14.9 G/DL (ref 13.3–17.7)
IMM GRANULOCYTES # BLD: 0 10E9/L (ref 0–0.4)
IMM GRANULOCYTES NFR BLD: 0.4 %
INTERPRETATION ECG - MUSE: NORMAL
LIPASE SERPL-CCNC: 39 U/L (ref 73–393)
LYMPHOCYTES # BLD AUTO: 1.6 10E9/L (ref 0.8–5.3)
LYMPHOCYTES NFR BLD AUTO: 14.5 %
MCH RBC QN AUTO: 28.1 PG (ref 26.5–33)
MCHC RBC AUTO-ENTMCNC: 32.3 G/DL (ref 31.5–36.5)
MCV RBC AUTO: 87 FL (ref 78–100)
MONOCYTES # BLD AUTO: 0.5 10E9/L (ref 0–1.3)
MONOCYTES NFR BLD AUTO: 4.3 %
NEUTROPHILS # BLD AUTO: 8.7 10E9/L (ref 1.6–8.3)
NEUTROPHILS NFR BLD AUTO: 80.4 %
NRBC # BLD AUTO: 0 10*3/UL
NRBC BLD AUTO-RTO: 0 /100
PLATELET # BLD AUTO: 298 10E9/L (ref 150–450)
POTASSIUM SERPL-SCNC: 3.8 MMOL/L (ref 3.4–5.3)
PROT SERPL-MCNC: 7.6 G/DL (ref 6.8–8.8)
RBC # BLD AUTO: 5.3 10E12/L (ref 4.4–5.9)
SODIUM SERPL-SCNC: 136 MMOL/L (ref 133–144)
WBC # BLD AUTO: 10.8 10E9/L (ref 4–11)

## 2018-10-02 PROCEDURE — 99285 EMERGENCY DEPT VISIT HI MDM: CPT | Mod: 25

## 2018-10-02 PROCEDURE — 25000128 H RX IP 250 OP 636: Performed by: EMERGENCY MEDICINE

## 2018-10-02 PROCEDURE — C9113 INJ PANTOPRAZOLE SODIUM, VIA: HCPCS | Performed by: EMERGENCY MEDICINE

## 2018-10-02 PROCEDURE — 96361 HYDRATE IV INFUSION ADD-ON: CPT

## 2018-10-02 PROCEDURE — 85025 COMPLETE CBC W/AUTO DIFF WBC: CPT | Performed by: EMERGENCY MEDICINE

## 2018-10-02 PROCEDURE — 83690 ASSAY OF LIPASE: CPT | Performed by: EMERGENCY MEDICINE

## 2018-10-02 PROCEDURE — 96375 TX/PRO/DX INJ NEW DRUG ADDON: CPT

## 2018-10-02 PROCEDURE — 96374 THER/PROPH/DIAG INJ IV PUSH: CPT

## 2018-10-02 PROCEDURE — 93005 ELECTROCARDIOGRAM TRACING: CPT

## 2018-10-02 PROCEDURE — 00000146 ZZHCL STATISTIC GLUCOSE BY METER IP

## 2018-10-02 PROCEDURE — 80053 COMPREHEN METABOLIC PANEL: CPT | Performed by: EMERGENCY MEDICINE

## 2018-10-02 RX ORDER — ONDANSETRON 4 MG/1
4 TABLET, ORALLY DISINTEGRATING ORAL EVERY 8 HOURS PRN
Qty: 10 TABLET | Refills: 0 | Status: SHIPPED | OUTPATIENT
Start: 2018-10-02 | End: 2020-11-23

## 2018-10-02 RX ORDER — ONDANSETRON 2 MG/ML
4 INJECTION INTRAMUSCULAR; INTRAVENOUS ONCE
Status: COMPLETED | OUTPATIENT
Start: 2018-10-02 | End: 2018-10-02

## 2018-10-02 RX ORDER — PANTOPRAZOLE SODIUM 40 MG/1
40 TABLET, DELAYED RELEASE ORAL DAILY
Qty: 30 TABLET | Refills: 0 | Status: SHIPPED | OUTPATIENT
Start: 2018-10-02 | End: 2018-10-02

## 2018-10-02 RX ADMIN — ONDANSETRON 4 MG: 2 INJECTION, SOLUTION INTRAMUSCULAR; INTRAVENOUS at 12:56

## 2018-10-02 RX ADMIN — SODIUM CHLORIDE 1000 ML: 9 INJECTION, SOLUTION INTRAVENOUS at 12:55

## 2018-10-02 RX ADMIN — PANTOPRAZOLE SODIUM 40 MG: 40 INJECTION, POWDER, FOR SOLUTION INTRAVENOUS at 14:50

## 2018-10-02 ASSESSMENT — ENCOUNTER SYMPTOMS
DIZZINESS: 0
VOMITING: 1
DIARRHEA: 0
CHILLS: 0
FEVER: 0
ABDOMINAL PAIN: 0
SHORTNESS OF BREATH: 0
NAUSEA: 1

## 2018-10-02 NOTE — ED PROVIDER NOTES
History     Chief Complaint:  Nausea & Vomiting    HPI   Rhett Juares is a 68 year old male with a history of type I diabetes mellitus who presents with vomiting. Around 1800 last night, the patient reports he first began feeling nauseous and started vomiting thereafter with one episode of what he describes as a coffee-ground emesis. Since then, he notes he has been persistently nauseous and vomited immediately after eating breakfast this morning, so he went to Urgent Care this morning for further evaluation where they found him to be hyperglycemic at 212 and suggested he come to the ED. The patient reports his nausea is exacerbated by moving around and turning his head. He denies any diarrhea, abdominal pain, fevers, headaches, chest pain, dizziness, or other acute symptoms. Of note, the patient states he has had an insulin pump for about 12 years and denies any issues with this. He denies any complications resulting from his diabetes.    Allergies:  Carbidopa-levodopa  Penicillin     Medications:    Allopurinol  Aspirin  Lipitor  Calcitriol  Hydrochlorothiazide  Lisinopril  Metoprolol  Seroquel  Effexor    Past Medical History:    Chronic kidney disease  Coronary artery disease  Diabetes mellitus  Gout  Hypertension  Lipoprotein deficiencies  Parksonism  Hypercholesterolemia    Past Surgical History:    Angioplasty    Family History:    Heart disease  Seizures  Tremors    Social History:  Smoking status: No  Alcohol use: Yes, occasionally  PCP: Radha Joseph  Presents to the ED with his spouse  Marital Status:  [2]     Review of Systems   Constitutional: Negative for chills and fever.   Respiratory: Negative for shortness of breath.    Cardiovascular: Negative for chest pain.   Gastrointestinal: Positive for nausea and vomiting. Negative for abdominal pain and diarrhea.   Neurological: Negative for dizziness.   All other systems reviewed and are negative.    Physical Exam   Patient  Vitals for the past 24 hrs:   BP Temp Temp src Pulse Resp SpO2   10/02/18 1430 - - - - - 98 %   10/02/18 1329 - - - - - 96 %   10/02/18 1234 165/86 98.1  F (36.7  C) Oral 70 20 98 %     Physical Exam  General: Patient is alert and interactive when I enter the room, in no acute distress  Head:  The scalp, face, and head appear normal  Eyes:  Conjunctivae are normal  ENT:    The nose is normal    Pinnae are normal    External acoustic canals are normal, dry mucous membranes  Neck:  Trachea midline  CV:  Pulses are normal  Resp:  No respiratory distress   Abdomen:      Soft, non-tender, non-distended  Musc:  Normal muscular tone    No major joint effusions  Skin:  No rash or lesions noted  Neuro:  Speech is normal and fluent. Face is symmetric.     Moving all extremities well.   Psych: Awake. Alert.  Normal affect.  Appropriate interactions.    Emergency Department Course   ECG (12:39:24):  Rate 65 bpm. AL interval 134. QRS duration 102. QT/QTc 440/457. P-R-T axes 17 48 41. Normal sinus rhythm. Normal ECG. Interpreted at 1239 by Anjelica Beasley MD.    Laboratory:  CBC: WNL (WBC 10.8, HGB 14.9, )  Glucose (1232): 174 (H)  CMP: Glucose (1248) 205 (H), BUN 41 (H), Creatinine 1.82 (H), GFR 37 (L), o/w WNL  Lipase: 39 (L)    Interventions:  1255: NS 1L IV Bolus  1256: 4 mg Zofran IV  1450: 40 mg Protonix IV    Emergency Department Course:  Past medical records, nursing notes, and vitals reviewed.  1231: I performed an exam of the patient and obtained history, as documented above.  ECG performed, results above.  IV inserted and blood drawn.    1351: I rechecked the patient. Explained findings to the patient and his spouse.    1405: The patient tolerated PO challenge without recurrence of his nausea.    I rechecked the patient. Findings and plan explained to the patient. Patient discharged home with instructions regarding supportive care, medications, and reasons to return. The importance of close follow-up was  reviewed.     Impression & Plan    Medical Decision Making:  Rhett Juares is a 68 year old male with a history of type I diabetes who presents to the ED for evaluation of vomiting. He denies any diarrhea and has no abdominal pain. It is possible he has diabetic gastroparesis. He reports coffee-ground emesis yesterday, but none today. He did vomit once today, which consisted of his previous meal. IV fluids were given. ECG showed no ischemic changes and his blood-work was unremarkable. No signs of DKA. He has known CKD, and this was unchanged from prior. The patient felt much improved with IV fluids and Zofran. The patient was able to tolerate PO without recurrence of his nausea/vomiting. I think the patient needs an endoscopy and I will start him on an acid-reduction medication as well as Zofran as needed. He has no signs of a GI bleed and is hemodynamically stable. He was encouraged to follow-up with GI, but was asked to return with worsening of his hematemesis or persistent vomiting.    Diagnosis:    ICD-10-CM   1. Hematemesis with nausea K92.0     Disposition: Discharged to home    Discharge Medications:  New Prescriptions    ONDANSETRON (ZOFRAN ODT) 4 MG ODT TAB    Take 1 tablet (4 mg) by mouth every 8 hours as needed for nausea    RANITIDINE (ZANTAC) 150 MG TABLET    Take 1 tablet (150 mg) by mouth 2 times daily for 10 days     Cat Rice  10/2/2018   [facility] EMERGENCY DEPARTMENT    ICat, hernan serving as a scribe at 12:31 PM on 10/2/2018 to document services personally performed by Anjelica Beasley MD based on my observations and the provider's statements to me.      Anjelica Beasley MD  10/04/18 [id number]

## 2018-10-02 NOTE — ED NOTES
When RN was hanging fluid, patient and wife noted that his vomit yesterday was black (looked like coffee ground). He has not vomited since. Denies alcohol use, and only take aspirin. MD notified.

## 2018-10-02 NOTE — ED AVS SNAPSHOT
[facility] Emergency Department    [address]    [facility] [address]    Phone:  [phone]    Fax:  [phone]                                       Rhett Juares   MRN: [id number]    Department:  [facility] Emergency Department   Date of Visit:  10/2/2018           Patient Information     Date Of Birth          1950        Your diagnoses for this visit were:     Hematemesis with nausea        You were seen by Anjelica Beasley MD.        Discharge Instructions         Upper GI Bleeding (Stable)  Your upper gastrointestinal (GI) tract includes your esophagus, stomach, and upper small intestine. You have signs of bleeding from your upper GI tract. You may have vomited or coughed up blood or coffee-ground like material. Or you may have black or tarry stools. Very small amounts of GI bleeding may not be visible and can only be found by a test of the stool.  Causes of upper GI bleeding can include:    Tear in the lining of the esophagus    Enlarged veins in the esophagus    An ulcer in the stomach or top of the small intestine    Severe irritation of the stomach    Inflammation of the digestive tract  A bloody nose or mouth or dental problems may cause blood to be swallowed and vomited up again. This is not true GI bleeding. Iron supplements and medicines for diarrhea and upset stomach can cause black stools. This is not GI bleeding and is not a cause for concern.  Home care  Depending on the cause of your bleeding, care may include the following:    You may be given medicines to help protect your GI tract, treat your problem, and promote healing. Take these as directed.    Don't take NSAIDs, such as aspirin, ibuprofen, or naproxen. They can irritate the stomach and cause further bleeding. If you are taking these medicines for other medical reasons, talk to your healthcare provider before you stop them.      Don't use alcohol, caffeine, or tobacco. These can  delay healing and make your problem worse.  Follow-up care  Follow up with your healthcare provider, or as advised. Further tests may need to be done to find the cause of your bleeding.  When to seek medical advice  Call your healthcare provider right away for any of the following:    Stomach pain appears or gets worse    Pain spreads to the neck, back, shoulder, or arm    Weakness or dizziness    Swelling of your abdomen    Red blood in your stool    Fever of 100.4 F (38 C) or higher, or as directed by your healthcare provider  Call 911  Call 911 if any of these occur:    Trouble breathing or swallowing    Severe dizziness    Loss of consciousness    Vomiting blood or large amounts of blood in the stool  Date Last Reviewed: 6/24/2015 2000-2017 [employer]. [address] [place] [address], [place] [address]. All rights reserved. This information is not intended as a substitute for professional medical care. Always follow your healthcare professional's instructions.    Discharge Instructions  Vomiting    You have been seen today for vomiting (throwing up). This is usually caused by a virus, but some bacteria, parasites, medicines or other medical conditions can cause similar symptoms. At this time your provider does not find that your vomiting is a sign of anything dangerous or life-threatening. However, sometimes the signs of serious illness do not show up right away. If you have new or worse symptoms, you may need to be seen again in the Emergency Department or by your primary provider. Remember that serious problems like appendicitis can start as vomiting.    Generally, every Emergency Department visit should have a follow-up clinic visit with either a primary or a specialty clinic/provider. Please follow-up as instructed by your emergency provider today.    Return to the Emergency Department if:    You keep vomiting and you are not able to keep liquids down.     You feel you are getting dehydrated, such  as being very thirsty, not urinating (peeing) at least every 8-12 hours, or feeling faint or lightheaded.     You develop a new fever, or your fever continues for more than 2 days.     You have abdominal (belly pain) that seems worse than cramps, is in one spot, or is getting worse over time. Appendicitis usually causes pain in the right lower abdomen (to the right and below your belly button) so watch for pain in this location.    You have blood in your vomit or stools.     You feel very weak.    You are not starting to improve within 24 hours of your visit here.     What can I do to help myself?    The most important thing to do is to drink clear liquids. If you have been vomiting a lot, it is best to have only small, frequent sips of liquids. Drinking too much at once may cause more vomiting. If you are vomiting often, you must replace minerals, sodium and potassium lost with your illness. Pedialyte  is the best available rehydration liquid but some find that it doesn t taste good so sports drinks are an alterative. You can also drink clear liquids such as water, weak tea, apple juice, and 7-Up . Avoid acid liquids (orange), caffeine (coffee) or alcohol. Do not drink milk until you no longer have diarrhea (loose stools).     After liquids are staying down, you may start eating mild foods. Soda crackers, toast, plain noodles, gelatin, applesauce and bananas are good first choices. Avoid foods that have acid, are spicy, fatty or have a lot of fiber (such as meats, coarse grains, vegetables). You may start eating these foods again in about 3 days when you are better.     Sometimes treatment includes prescription medicine to prevent nausea (sick to your stomach) and vomiting. If your provider prescribes these for you, take them as directed.     Do not take ibuprofen, naproxen, or other nonsteroidal anti-inflammatory (NSAID) medicines without checking with your healthcare provider.     If you were given a prescription  for medicine here today, be sure to read all of the information (including the package insert) that comes with your prescription.  This will include important information about the medicine, its side effects, and any warnings that you need to know about.  The pharmacist who fills the prescription can provide more information and answer questions you may have about the medicine.  If you have questions or concerns that the pharmacist cannot address, please call or return to the Emergency Department.     Remember that you can always come back to the Emergency Department if you are not able to see your regular provider in the amount of time listed above, if you get any new symptoms, or if there is anything that worries you.        24 Hour Appointment Hotline       To make an appointment at any [facility], call [phone] ([phone]). If you don't have a family doctor or clinic, we will help you find one. [facility] clinics are conveniently located to serve the needs of you and your family.             Review of your medicines      START taking        Dose / Directions Last dose taken    ondansetron 4 MG ODT tab   Commonly known as:  ZOFRAN ODT   Dose:  4 mg   Quantity:  10 tablet        Take 1 tablet (4 mg) by mouth every 8 hours as needed for nausea   Refills:  0        ranitidine 150 MG tablet   Commonly known as:  ZANTAC   Dose:  150 mg   Quantity:  20 tablet        Take 1 tablet (150 mg) by mouth 2 times daily for 10 days   Refills:  0          Our records show that you are taking the medicines listed below. If these are incorrect, please call your family doctor or clinic.        Dose / Directions Last dose taken    allopurinol 100 MG tablet   Commonly known as:  ZYLOPRIM   Dose:  100 mg        Take 100 mg by mouth daily   Refills:  0        ASPIRIN PO   Dose:  81 mg        Take 81 mg by mouth daily   Refills:  0        atorvastatin 80 MG tablet   Commonly known as:  LIPITOR   Dose:  40 mg   Quantity:   45 tablet        Take 0.5 tablets (40 mg) by mouth daily   Refills:  3        CALCITRIOL PO   Dose:  0.25 mcg        Take 0.25 mcg by mouth three times a week.   Refills:  0        Fish Oil 1000 MG Cpdr        Refills:  0        hydrochlorothiazide 25 MG tablet   Commonly known as:  HYDRODIURIL   Dose:  25 mg   Quantity:  90 tablet        Take 1 tablet by mouth daily.   Refills:  2        lisinopril 20 MG tablet   Commonly known as:  PRINIVIL/ZESTRIL   Dose:  20 mg        Take 20 mg by mouth daily   Refills:  0        metoprolol succinate 50 MG 24 hr tablet   Commonly known as:  TOPROL-XL   Dose:  50 mg        Take 50 mg by mouth daily   Refills:  0        QUEtiapine 100 MG tablet   Commonly known as:  SEROquel   Dose:  100 mg        Take 100 mg by mouth daily   Refills:  0        venlafaxine 150 MG 24 hr capsule   Commonly known as:  EFFEXOR-XR   Dose:  150 mg        Take 150 mg by mouth daily   Refills:  0        VITAMIN D3 PO   Dose:  2000 Units        Take 2,000 Units by mouth daily   Refills:  0                Prescriptions were sent or printed at these locations (2 Prescriptions)                   Other Prescriptions                Printed at Department/Unit printer (2 of 2)         ondansetron (ZOFRAN ODT) 4 MG ODT tab               ranitidine (ZANTAC) 150 MG tablet                Procedures and tests performed during your visit     CBC with platelets differential    Comprehensive metabolic panel    EKG 12 lead    Glucose by meter    Lipase      Orders Needing Specimen Collection     None      Pending Results     No orders found from 9/30/2018 to 10/3/2018.            Pending Culture Results     No orders found from 9/30/2018 to 10/3/2018.            Pending Results Instructions     If you had any lab results that were not finalized at the time of your Discharge, you can call the ED Lab Result RN at [phone]. You will be contacted by this team for any positive Lab results or changes in treatment. The  nurses are available 7 days a week from 10A to 6:30P.  You can leave a message 24 hours per day and they will return your call.        Test Results From Your Hospital Stay        10/2/2018  1:33 PM      Component Results     Component Value Ref Range & Units Status    WBC 10.8 4.0 - 11.0 10e9/L Final    RBC Count 5.30 4.4 - 5.9 10e12/L Final    Hemoglobin 14.9 13.3 - 17.7 g/dL Final    Hematocrit 46.1 40.0 - 53.0 % Final    MCV 87 78 - 100 fl Final    MCH 28.1 26.5 - 33.0 pg Final    MCHC 32.3 31.5 - 36.5 g/dL Final    RDW 15.2 (H) 10.0 - 15.0 % Final    Platelet Count 298 150 - 450 10e9/L Final    Diff Method Automated Method  Final    % Neutrophils 80.4 % Final    % Lymphocytes 14.5 % Final    % Monocytes 4.3 % Final    % Eosinophils 0.1 % Final    % Basophils 0.3 % Final    % Immature Granulocytes 0.4 % Final    Nucleated RBCs 0 0 /100 Final    Absolute Neutrophil 8.7 (H) 1.6 - 8.3 10e9/L Final    Absolute Lymphocytes 1.6 0.8 - 5.3 10e9/L Final    Absolute Monocytes 0.5 0.0 - 1.3 10e9/L Final    Absolute Eosinophils 0.0 0.0 - 0.7 10e9/L Final    Absolute Basophils 0.0 0.0 - 0.2 10e9/L Final    Abs Immature Granulocytes 0.0 0 - 0.4 10e9/L Final    Absolute Nucleated RBC 0.0  Final         10/2/2018  1:33 PM      Component Results     Component Value Ref Range & Units Status    Sodium 136 133 - 144 mmol/L Final    Potassium 3.8 3.4 - 5.3 mmol/L Final    Chloride 102 94 - 109 mmol/L Final    Carbon Dioxide 27 20 - 32 mmol/L Final    Anion Gap 7 3 - 14 mmol/L Final    Glucose 205 (H) 70 - 99 mg/dL Final    Urea Nitrogen 41 (H) 7 - 30 mg/dL Final    Creatinine 1.82 (H) 0.66 - 1.25 mg/dL Final    GFR Estimate 37 (L) >60 mL/min/1.7m2 Final    Non  GFR Calc    GFR Estimate If Black 45 (L) >60 mL/min/1.7m2 Final    African American GFR Calc    Calcium 9.2 8.5 - 10.1 mg/dL Final    Bilirubin Total 0.5 0.2 - 1.3 mg/dL Final    Albumin 3.7 3.4 - 5.0 g/dL Final    Protein Total 7.6 6.8 - 8.8 g/dL Final     Alkaline Phosphatase 118 40 - 150 U/L Final    ALT 20 0 - 70 U/L Final    AST 15 0 - 45 U/L Final         10/2/2018  1:33 PM      Component Results     Component Value Ref Range & Units Status    Lipase 39 (L) 73 - 393 U/L Final         10/2/2018  1:35 PM      Component Results     Component Value Ref Range & Units Status    Glucose 174 (H) 70 - 99 mg/dL Final                Clinical Quality Measure: Blood Pressure Screening     Your blood pressure was checked while you were in the emergency department today. The last reading we obtained was  BP: 165/86 . Please read the guidelines below about what these numbers mean and what you should do about them.  If your systolic blood pressure (the top number) is less than 120 and your diastolic blood pressure (the bottom number) is less than 80, then your blood pressure is normal. There is nothing more that you need to do about it.  If your systolic blood pressure (the top number) is 120-139 or your diastolic blood pressure (the bottom number) is 80-89, your blood pressure may be higher than it should be. You should have your blood pressure rechecked within a year by a primary care provider.  If your systolic blood pressure (the top number) is 140 or greater or your diastolic blood pressure (the bottom number) is 90 or greater, you may have high blood pressure. High blood pressure is treatable, but if left untreated over time it can put you at risk for heart attack, stroke, or kidney failure. You should have your blood pressure rechecked by a primary care provider within the next 4 weeks.  If your provider in the emergency department today gave you specific instructions to follow-up with your doctor or provider even sooner than that, you should follow that instruction and not wait for up to 4 weeks for your follow-up visit.        Thank you for choosing [facility]       Thank you for choosing [facility] for your care. Our goal is always to provide you with excellent care.  "Hearing back from our patients is one way we can continue to improve our services. Please take a few minutes to complete the written survey that you may receive in the mail after you visit with us. Thank you!        [facility] Information     [facility]ksul lets you send messages to your doctor, view your test results, renew your prescriptions, schedule appointments and more. To sign up, go to [email or web address] . Click on \"Log in\" on the left side of the screen, which will take you to the Welcome page. Then click on \"Sign up Now\" on the right side of the page.     You will be asked to enter the access code listed below, as well as some personal information. Please follow the directions to create your username and password.     Your access code is: [email or web address]  Expires: 2018 12:46 PM     Your access code will  in 90 days. If you need help or a new code, please call your [facility] clinic or [phone].        Care EveryWhere ID     This is your Care EveryWhere ID. This could be used by other organizations to access your [facility] medical records  [id number]        Equal Access to Services     HARMAN ROJO : Hadii eric Linda, eben vidales, maged mccauley, amy santana . So [facility] [phone].    ATENCIÓN: Si habla español, tiene a gonzalez disposición servicios gratuitos de asistencia lingüística. Llame al [phone].    We comply with applicable federal civil rights laws and [place] laws. We do not discriminate on the basis of race, color, national origin, age, disability, sex, sexual orientation, or gender identity.            After Visit Summary       This is your record. Keep this with you and show to your community pharmacist(s) and doctor(s) at your next visit.                  "

## 2018-10-02 NOTE — DISCHARGE INSTRUCTIONS
Upper GI Bleeding (Stable)  Your upper gastrointestinal (GI) tract includes your esophagus, stomach, and upper small intestine. You have signs of bleeding from your upper GI tract. You may have vomited or coughed up blood or coffee-ground like material. Or you may have black or tarry stools. Very small amounts of GI bleeding may not be visible and can only be found by a test of the stool.  Causes of upper GI bleeding can include:    Tear in the lining of the esophagus    Enlarged veins in the esophagus    An ulcer in the stomach or top of the small intestine    Severe irritation of the stomach    Inflammation of the digestive tract  A bloody nose or mouth or dental problems may cause blood to be swallowed and vomited up again. This is not true GI bleeding. Iron supplements and medicines for diarrhea and upset stomach can cause black stools. This is not GI bleeding and is not a cause for concern.  Home care  Depending on the cause of your bleeding, care may include the following:    You may be given medicines to help protect your GI tract, treat your problem, and promote healing. Take these as directed.    Don't take NSAIDs, such as aspirin, ibuprofen, or naproxen. They can irritate the stomach and cause further bleeding. If you are taking these medicines for other medical reasons, talk to your healthcare provider before you stop them.      Don't use alcohol, caffeine, or tobacco. These can delay healing and make your problem worse.  Follow-up care  Follow up with your healthcare provider, or as advised. Further tests may need to be done to find the cause of your bleeding.  When to seek medical advice  Call your healthcare provider right away for any of the following:    Stomach pain appears or gets worse    Pain spreads to the neck, back, shoulder, or arm    Weakness or dizziness    Swelling of your abdomen    Red blood in your stool    Fever of 100.4 F (38 C) or higher, or as directed by your healthcare  provider  Call 911  Call 911 if any of these occur:    Trouble breathing or swallowing    Severe dizziness    Loss of consciousness    Vomiting blood or large amounts of blood in the stool  Date Last Reviewed: 6/24/2015 2000-2017 [employer]. [address] [place] [address], [place] [address]. All rights reserved. This information is not intended as a substitute for professional medical care. Always follow your healthcare professional's instructions.    Discharge Instructions  Vomiting    You have been seen today for vomiting (throwing up). This is usually caused by a virus, but some bacteria, parasites, medicines or other medical conditions can cause similar symptoms. At this time your provider does not find that your vomiting is a sign of anything dangerous or life-threatening. However, sometimes the signs of serious illness do not show up right away. If you have new or worse symptoms, you may need to be seen again in the Emergency Department or by your primary provider. Remember that serious problems like appendicitis can start as vomiting.    Generally, every Emergency Department visit should have a follow-up clinic visit with either a primary or a specialty clinic/provider. Please follow-up as instructed by your emergency provider today.    Return to the Emergency Department if:    You keep vomiting and you are not able to keep liquids down.     You feel you are getting dehydrated, such as being very thirsty, not urinating (peeing) at least every 8-12 hours, or feeling faint or lightheaded.     You develop a new fever, or your fever continues for more than 2 days.     You have abdominal (belly pain) that seems worse than cramps, is in one spot, or is getting worse over time. Appendicitis usually causes pain in the right lower abdomen (to the right and below your belly button) so watch for pain in this location.    You have blood in your vomit or stools.     You feel very weak.    You are not starting to  improve within 24 hours of your visit here.     What can I do to help myself?    The most important thing to do is to drink clear liquids. If you have been vomiting a lot, it is best to have only small, frequent sips of liquids. Drinking too much at once may cause more vomiting. If you are vomiting often, you must replace minerals, sodium and potassium lost with your illness. Pedialyte  is the best available rehydration liquid but some find that it doesn t taste good so sports drinks are an alterative. You can also drink clear liquids such as water, weak tea, apple juice, and 7-Up . Avoid acid liquids (orange), caffeine (coffee) or alcohol. Do not drink milk until you no longer have diarrhea (loose stools).     After liquids are staying down, you may start eating mild foods. Soda crackers, toast, plain noodles, gelatin, applesauce and bananas are good first choices. Avoid foods that have acid, are spicy, fatty or have a lot of fiber (such as meats, coarse grains, vegetables). You may start eating these foods again in about 3 days when you are better.     Sometimes treatment includes prescription medicine to prevent nausea (sick to your stomach) and vomiting. If your provider prescribes these for you, take them as directed.     Do not take ibuprofen, naproxen, or other nonsteroidal anti-inflammatory (NSAID) medicines without checking with your healthcare provider.     If you were given a prescription for medicine here today, be sure to read all of the information (including the package insert) that comes with your prescription.  This will include important information about the medicine, its side effects, and any warnings that you need to know about.  The pharmacist who fills the prescription can provide more information and answer questions you may have about the medicine.  If you have questions or concerns that the pharmacist cannot address, please call or return to the Emergency Department.     Remember that you  can always come back to the Emergency Department if you are not able to see your regular provider in the amount of time listed above, if you get any new symptoms, or if there is anything that worries you.

## 2018-10-02 NOTE — ED TRIAGE NOTES
Arrives complaining of nausea and vomiting, states this began yesterday and is worse with walking, denies dizziness, denies pain, alert and oriented, ABCs intact.

## 2018-10-02 NOTE — ED AVS SNAPSHOT
[facility] Emergency Department    [address]    [facility] [address]    Phone:  [phone]    Fax:  [phone]                                       Rhett Juares   MRN: [id number]    Department:  [facility] Emergency Department   Date of Visit:  10/2/2018           After Visit Summary Signature Page     I have received my discharge instructions, and my questions have been answered. I have discussed any challenges I see with this plan with the nurse or doctor.    ..........................................................................................................................................  Patient/Patient Representative Signature      ..........................................................................................................................................  Patient Representative Print Name and Relationship to Patient    ..................................................               ................................................  Date                                   Time    ..........................................................................................................................................  Reviewed by Signature/Title    ...................................................              ..............................................  Date                                               Time          22EPIC Rev 08/18

## 2018-10-03 ENCOUNTER — DOCUMENTATION ONLY (OUTPATIENT)
Dept: CARDIOLOGY | Facility: CLINIC | Age: 68
End: 2018-10-03

## 2018-11-12 ENCOUNTER — TELEPHONE (OUTPATIENT)
Dept: CARDIOLOGY | Facility: CLINIC | Age: 68
End: 2018-11-12

## 2018-11-12 DIAGNOSIS — I25.10 CORONARY ARTERY DISEASE INVOLVING NATIVE CORONARY ARTERY OF NATIVE HEART WITHOUT ANGINA PECTORIS: Primary | ICD-10-CM

## 2018-11-12 DIAGNOSIS — K92.2 GI BLEED: ICD-10-CM

## 2018-11-12 NOTE — TELEPHONE ENCOUNTER
Patient called asking if he could stop a medication, Attempted to contact patient, What medication and reason why?  Message left to call back.

## 2018-11-14 NOTE — TELEPHONE ENCOUNTER
Attempted to contact patient, left message for patient to call back.  Called wife's phone.  Spoke with patient. He states he was seen in the ER 10/2/18 for nausea and vomiting coffee-ground emesis. He was started on 10 days of zantac, aspirin 81mg was stopped and he had an UGI. His scope was negative, he has shown no more signs of bleeding and is feeling well. He has been off the aspirin for a month and wonders if Dr. De La Garza wants him to start it again.  Patient is overdue for his [facility] annual visit.  Will message Dr. De La Garza to review

## 2018-11-15 NOTE — TELEPHONE ENCOUNTER
Attempted to contact patient to review Dr. De La Garza's recommendation to restart asprin 81mg enteric-coated on MWF. Left message for patient to call back.

## 2018-11-15 NOTE — TELEPHONE ENCOUNTER
Informed patient of Dr De La Garza's recommendations to take enteric-coated aspirin 81mg MWF. Pt verbalized understanding. Pt declined prescription, will buy OTC.

## 2018-12-12 DIAGNOSIS — E78.5 HYPERLIPIDEMIA LDL GOAL <100: ICD-10-CM

## 2018-12-12 RX ORDER — ATORVASTATIN CALCIUM 80 MG/1
40 TABLET, FILM COATED ORAL DAILY
Qty: 45 TABLET | Refills: 0 | Status: SHIPPED | OUTPATIENT
Start: 2018-12-12 | End: 2019-01-15

## 2019-01-15 ENCOUNTER — OFFICE VISIT (OUTPATIENT)
Dept: CARDIOLOGY | Facility: CLINIC | Age: 69
End: 2019-01-15
Payer: COMMERCIAL

## 2019-01-15 VITALS
HEART RATE: 60 BPM | WEIGHT: 209 LBS | DIASTOLIC BLOOD PRESSURE: 68 MMHG | HEIGHT: 68 IN | BODY MASS INDEX: 31.67 KG/M2 | SYSTOLIC BLOOD PRESSURE: 120 MMHG

## 2019-01-15 DIAGNOSIS — I25.10 CORONARY ARTERY DISEASE INVOLVING NATIVE CORONARY ARTERY OF NATIVE HEART WITHOUT ANGINA PECTORIS: ICD-10-CM

## 2019-01-15 DIAGNOSIS — E78.5 HYPERLIPIDEMIA LDL GOAL <100: ICD-10-CM

## 2019-01-15 LAB
ANION GAP SERPL CALCULATED.3IONS-SCNC: 5 MMOL/L (ref 3–14)
BUN SERPL-MCNC: 48 MG/DL (ref 7–30)
CALCIUM SERPL-MCNC: 9 MG/DL (ref 8.5–10.1)
CHLORIDE SERPL-SCNC: 106 MMOL/L (ref 94–109)
CHOLEST SERPL-MCNC: 108 MG/DL
CO2 SERPL-SCNC: 27 MMOL/L (ref 20–32)
CREAT SERPL-MCNC: 1.81 MG/DL (ref 0.66–1.25)
GFR SERPL CREATININE-BSD FRML MDRD: 38 ML/MIN/{1.73_M2}
GLUCOSE SERPL-MCNC: 92 MG/DL (ref 70–99)
HDLC SERPL-MCNC: 35 MG/DL
LDLC SERPL CALC-MCNC: 53 MG/DL
NONHDLC SERPL-MCNC: 73 MG/DL
POTASSIUM SERPL-SCNC: 4.2 MMOL/L (ref 3.4–5.3)
SODIUM SERPL-SCNC: 138 MMOL/L (ref 133–144)
TRIGL SERPL-MCNC: 99 MG/DL

## 2019-01-15 PROCEDURE — 80048 BASIC METABOLIC PNL TOTAL CA: CPT | Performed by: INTERNAL MEDICINE

## 2019-01-15 PROCEDURE — 99214 OFFICE O/P EST MOD 30 MIN: CPT | Performed by: NURSE PRACTITIONER

## 2019-01-15 PROCEDURE — 80061 LIPID PANEL: CPT | Performed by: INTERNAL MEDICINE

## 2019-01-15 PROCEDURE — 36415 COLL VENOUS BLD VENIPUNCTURE: CPT | Performed by: INTERNAL MEDICINE

## 2019-01-15 RX ORDER — AMLODIPINE BESYLATE 2.5 MG/1
2.5 TABLET ORAL DAILY
COMMUNITY

## 2019-01-15 RX ORDER — ATORVASTATIN CALCIUM 80 MG/1
40 TABLET, FILM COATED ORAL DAILY
Qty: 45 TABLET | Refills: 3 | Status: SHIPPED | OUTPATIENT
Start: 2019-01-15 | End: 2020-01-22

## 2019-01-15 ASSESSMENT — MIFFLIN-ST. JEOR: SCORE: 1697.52

## 2019-01-15 NOTE — PROGRESS NOTES
Service Date: 01/15/2019      HISTORY OF PRESENT ILLNESS:  This 68-year-old male presents to the [facility] today for a followup visit.  He is a patient of Dr. De La Garza seen in our clinic for coronary artery disease, hypertension, juvenile diabetes, hyperlipidemia and chronic kidney disease.      Rhett underwent stenting to an occluded second VIVIAN in 2012 after an abnormal stress test and nonsustained ventricular tachycardia.  At that time he was also noted to have 60% OM disease and 60% first VIVIAN disease.  A followup stress echocardiogram done later in the year was normal.  In followup, he has been free from any angina symptoms.      Rhett is on multiple agents to control his blood pressure.  It appears that recently he was started on amlodipine due to systolic blood pressures staying at 140 mmHg.  He follows with Dr. Cuba for stage III chronic kidney disease.  Over the past year, review shows a stable creatinine of 1.8.  He returned today for reassessment along with laboratory work.      Rhett in general tells me he is doing well.  He is trying to exercise on a regular basis at the [facility].  While there, he walks about 2 miles a day and is intermittently playing [employer] ball.  He denies any chest pain with activity or at rest.  He is not short of breath.  He denies any palpitations, lightheadedness, dizziness, orthopnea or significant peripheral edema.      PHYSICAL EXAMINATION:  His blood pressure today is 120/68, heart rate is 60 beats per minute and is regular.  His lungs are clear.  There is no peripheral edema.  Weight stable at 209 pounds, placing his BMI at 31.      I reviewed his laboratory work today showing sodium 138, potassium 4.2, BUN of 48, creatinine of 1.8, total cholesterol 108, triglyceride level 99, HDL level 35, LDL level of 53.      IMPRESSION AND PLAN:   1.  Coronary artery disease.  History of stenting to a second VIVIAN in 2012.  He has remaining moderate  OM and first VIVIAN disease being medically managed.  He is free from any angina and doing well in followup.  We will continue with current medical regimen including aspirin, beta blocker, ACE inhibitor therapy and the statin.   2.  Hypertension.  Blood pressure now at goal on multiple medications.  Amlodipine was recently added by his primary medical doctor.   3.  Juvenile diabetes.  He has been on insulin pump for many years.  Recent hemoglobin A1c of 7.1%.   4.  Treated mixed hyperlipidemia.  We will continue with atorvastatin and I have renewed this prescription for him today.  He denies any myalgias.   5.  Stage III chronic kidney disease.  Creatinine stable at 1.8.  He is followed by Dr. Cuba.      Thanks for allowing me to participate in this patient's care.  We will have him return next year as planned.         YVETTE BRYANT, CNP             D: 01/15/2019   T: 01/15/2019   MT: RADHA      Name:     TIFFANY LUCERO   MRN:      [id number]        Account:      [id number]   :      1950           Service Date: 01/15/2019      Document: [id number]

## 2019-01-15 NOTE — LETTER
1/15/2019    Radha Joseph NP  [facility] [address] [place] [address]    RE: Rhett Juares       Dear Colleague,    I had the pleasure of seeing Rhett Juares in the [facility].    HPI and Plan:   See dictation  #[id number]    No orders of the defined types were placed in this encounter.      Orders Placed This Encounter   Medications     amLODIPine (NORVASC) 2.5 MG tablet     Sig: Take 2.5 mg by mouth daily     atorvastatin (LIPITOR) 80 MG tablet     Sig: Take 0.5 tablets (40 mg) by mouth daily     Dispense:  45 tablet     Refill:  3       Medications Discontinued During This Encounter   Medication Reason     atorvastatin (LIPITOR) 80 MG tablet Reorder         Encounter Diagnosis   Name Primary?     Hyperlipidemia LDL goal <100        CURRENT MEDICATIONS:  Current Outpatient Medications   Medication Sig Dispense Refill     allopurinol (ZYLOPRIM) 100 MG tablet Take 100 mg by mouth daily       amLODIPine (NORVASC) 2.5 MG tablet Take 2.5 mg by mouth daily       aspirin (ASPIRIN EC ADULT LOW STRENGTH) 81 MG EC tablet Aspirin 81mg on Monday, Wednesday, Friday 1 tablet 0     atorvastatin (LIPITOR) 80 MG tablet Take 0.5 tablets (40 mg) by mouth daily 45 tablet 3     CALCITRIOL PO Take 0.25 mcg by mouth three times a week.         hydrochlorothiazide (HYDRODIURIL) 25 MG tablet Take 1 tablet by mouth daily. 90 tablet 2     lisinopril (PRINIVIL,ZESTRIL) 20 MG tablet Take 20 mg by mouth daily       metoprolol (TOPROL-XL) 50 MG 24 hr tablet Take 50 mg by mouth daily       QUEtiapine (SEROQUEL) 100 MG tablet Take 100 mg by mouth daily       venlafaxine (EFFEXOR-XR) 150 MG 24 hr capsule Take 150 mg by mouth daily       Cholecalciferol (VITAMIN D3 PO) Take 2,000 Units by mouth daily        Omega-3 Fatty Acids (FISH OIL) 1000 MG CPDR        ondansetron (ZOFRAN ODT) 4 MG ODT tab Take 1 tablet (4 mg) by mouth every 8 hours as needed for nausea  (Patient not taking: Reported on 1/15/2019) 10 tablet 0       ALLERGIES     Allergies   Allergen Reactions     Carbidopa-Levodopa Nausea and Vomiting     Penicillin G        PAST MEDICAL HISTORY:  Past Medical History:   Diagnosis Date     Chronic kidney disease 07/06/2015    managed by Dr. Culver     Coronary artery disease involving native coronary artery of native heart without angina pectoris 09/2012 2012 - NIECY to posterolateral branch      Diabetes mellitus (H)     insulin pump, managed by Dr. Guajardo     GI bleed     coffee-ground emesis Oct 2018     Gout      Hypertension      Lipoprotein deficiencies      Parkinsonism (H) 12/4/2014     Pure hypercholesterolemia      S/P coronary artery stent placement 12/3/2014     Tremor 12/3/2014       PAST SURGICAL HISTORY:  Past Surgical History:   Procedure Laterality Date     HEART CATH, ANGIOPLASTY  9/14/12    second posterolateral branch off the dominant right coronary artery,       FAMILY HISTORY:  Family History   Problem Relation Age of Onset     Heart Disease Mother      Neurologic Disorder Father         Tremors at age of 70/Seizures       SOCIAL HISTORY:  Social History     Socioeconomic History     Marital status:      Spouse name: None     Number of children: None     Years of education: None     Highest education level: None   Social Needs     Financial resource strain: None     Food insecurity - worry: None     Food insecurity - inability: None     Transportation needs - medical: None     Transportation needs - non-medical: None   Occupational History     None   Tobacco Use     Smoking status: Never Smoker     Smokeless tobacco: Never Used   Substance and Sexual Activity     Alcohol use: Yes     Alcohol/week: 0.0 oz     Comment: occasional beer     Drug use: No     Sexual activity: None     Comment: not assessed   Other Topics Concern     Parent/sibling w/ CABG, MI or angioplasty before 65F 55M? 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            FH: family history includes Heart in his mother; Neurological in his father and father.       Review of Systems:  Skin:  Negative for       Eyes:  Positive for glasses    ENT:  Positive for hearing loss    Respiratory:  Positive for sleep apnea;CPAP     Cardiovascular:  Negative      Gastroenterology: Positive for heartburn    Genitourinary:  not assessed      Musculoskeletal:  Negative for      Neurologic:  Positive for numbness or tingling of hands    Psychiatric:  Negative for      Heme/Lymph/Imm:  Negative      Endocrine:  Positive for diabetes      Physical Exam:  Vitals: /68   Pulse 60   Ht 1.727 m (5' 8\")   Wt 94.8 kg (209 lb)   BMI 31.78 kg/m       Constitutional:  cooperative;in no acute distress overweight      Skin:  warm and dry to the touch          Head:  normocephalic        Eyes:  pupils equal and round        Lymph:      ENT:  no pallor or cyanosis        Neck:  JVP normal;no carotid bruit        Respiratory:  clear to auscultation;normal respiratory excursion         Cardiac: regular rhythm;normal S1 and S2     no presence of murmur          pulses full and equal                                        GI:  abdomen soft;no bruits        Extremities and Muscular Skeletal:  no edema;normal " muscle strength and tone              Neurological:  no gross motor deficits        Psych:  affect appropriate, oriented to time, person and place        CC  No referring provider defined for this encounter.                Thank you for allowing me to participate in the care of your patient.      Sincerely,     YVETTE Hanks [facility]    cc:   No referring provider defined for this encounter.

## 2019-01-15 NOTE — PROGRESS NOTES
HPI and Plan:   See dictation  #[id number]    No orders of the defined types were placed in this encounter.      Orders Placed This Encounter   Medications     amLODIPine (NORVASC) 2.5 MG tablet     Sig: Take 2.5 mg by mouth daily     atorvastatin (LIPITOR) 80 MG tablet     Sig: Take 0.5 tablets (40 mg) by mouth daily     Dispense:  45 tablet     Refill:  3       Medications Discontinued During This Encounter   Medication Reason     atorvastatin (LIPITOR) 80 MG tablet Reorder         Encounter Diagnosis   Name Primary?     Hyperlipidemia LDL goal <100        CURRENT MEDICATIONS:  Current Outpatient Medications   Medication Sig Dispense Refill     allopurinol (ZYLOPRIM) 100 MG tablet Take 100 mg by mouth daily       amLODIPine (NORVASC) 2.5 MG tablet Take 2.5 mg by mouth daily       aspirin (ASPIRIN EC ADULT LOW STRENGTH) 81 MG EC tablet Aspirin 81mg on Monday, Wednesday, Friday 1 tablet 0     atorvastatin (LIPITOR) 80 MG tablet Take 0.5 tablets (40 mg) by mouth daily 45 tablet 3     CALCITRIOL PO Take 0.25 mcg by mouth three times a week.         hydrochlorothiazide (HYDRODIURIL) 25 MG tablet Take 1 tablet by mouth daily. 90 tablet 2     lisinopril (PRINIVIL,ZESTRIL) 20 MG tablet Take 20 mg by mouth daily       metoprolol (TOPROL-XL) 50 MG 24 hr tablet Take 50 mg by mouth daily       QUEtiapine (SEROQUEL) 100 MG tablet Take 100 mg by mouth daily       venlafaxine (EFFEXOR-XR) 150 MG 24 hr capsule Take 150 mg by mouth daily       Cholecalciferol (VITAMIN D3 PO) Take 2,000 Units by mouth daily        Omega-3 Fatty Acids (FISH OIL) 1000 MG CPDR        ondansetron (ZOFRAN ODT) 4 MG ODT tab Take 1 tablet (4 mg) by mouth every 8 hours as needed for nausea (Patient not taking: Reported on 1/15/2019) 10 tablet 0       ALLERGIES     Allergies   Allergen Reactions     Carbidopa-Levodopa Nausea and Vomiting     Penicillin G        PAST MEDICAL HISTORY:  Past Medical History:   Diagnosis Date     Chronic kidney disease 07/06/2015     managed by Dr. Culver     Coronary artery disease involving native coronary artery of native heart without angina pectoris 09/2012 2012 - NIECY to posterolateral branch      Diabetes mellitus (H)     insulin pump, managed by Dr. Bennett yanes     coffee-ground emesis Oct 2018     Gout      Hypertension      Lipoprotein deficiencies      Parkinsonism (H) 12/4/2014     Pure hypercholesterolemia      S/P coronary artery stent placement 12/3/2014     Tremor 12/3/2014       PAST SURGICAL HISTORY:  Past Surgical History:   Procedure Laterality Date     HEART CATH, ANGIOPLASTY  9/14/12    second posterolateral branch off the dominant right coronary artery,       FAMILY HISTORY:  Family History   Problem Relation Age of Onset     Heart Disease Mother      Neurologic Disorder Father         Tremors at age of 70/Seizures       SOCIAL HISTORY:  Social History     Socioeconomic History     Marital status:      Spouse name: None     Number of children: None     Years of education: None     Highest education level: None   Social Needs     Financial resource strain: None     Food insecurity - worry: None     Food insecurity - inability: None     Transportation needs - medical: None     Transportation needs - non-medical: None   Occupational History     None   Tobacco Use     Smoking status: Never Smoker     Smokeless tobacco: Never Used   Substance and Sexual Activity     Alcohol use: Yes     Alcohol/week: 0.0 oz     Comment: occasional beer     Drug use: No     Sexual activity: None     Comment: not assessed   Other Topics Concern     Parent/sibling w/ CABG, MI or angioplasty before 65F 55M? 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            FH: family history includes Heart in his mother; Neurological in his father and father.       Review of Systems:  Skin:  Negative for       Eyes:  Positive for glasses    ENT:  Positive for hearing loss    Respiratory:  Positive for sleep apnea;CPAP     Cardiovascular:  Negative      Gastroenterology: Positive for heartburn    Genitourinary:  not assessed      Musculoskeletal:  Negative for      Neurologic:  Positive for numbness or tingling of hands    Psychiatric:  Negative for      Heme/Lymph/Imm:  Negative      Endocrine:  Positive for diabetes      Physical Exam:  Vitals: /68   Pulse 60   Ht 1.727 m (5' 8\")   Wt 94.8 kg (209 lb)   BMI 31.78 kg/m      Constitutional:  cooperative;in no acute distress overweight      Skin:  warm and dry to the touch          Head:  normocephalic        Eyes:  pupils equal and round        Lymph:      ENT:  no pallor or cyanosis        Neck:  JVP normal;no carotid bruit        Respiratory:  clear to auscultation;normal respiratory excursion         Cardiac: regular rhythm;normal S1 and S2     no presence of murmur          pulses full and equal                                        GI:  abdomen soft;no bruits        Extremities and Muscular Skeletal:  no edema;normal muscle strength and tone              Neurological:  no gross motor deficits        Psych:  affect appropriate, oriented to time, person and place        CC  No referring provider defined for this encounter.              "

## 2019-01-15 NOTE — LETTER
1/15/2019      Radha Joseph NP  [facility] [address] [place] [address]      RE: Rhett Alicia Mor       Dear Colleague,    I had the pleasure of seeing Rhett Juares in the [facility].    Service Date: 01/15/2019      HISTORY OF PRESENT ILLNESS:  This 68-year-old male presents to the [facility] today for a followup visit.  He is a patient of Dr. De La Garza seen in our clinic for coronary artery disease, hypertension, juvenile diabetes, hyperlipidemia and chronic kidney disease.      Rhett underwent stenting to an occluded second VIVIAN in 2012 after an abnormal stress test and nonsustained ventricular tachycardia.  At that time he was also noted to have 60% OM disease and 60% first VIVIAN disease.  A followup stress echocardiogram done later in the year was normal.  In followup, he has been free from any angina symptoms.      Rhett is on multiple agents to control his blood pressure.  It appears that recently he was started on amlodipine due to systolic blood pressures staying at 140 mmHg.  He follows with Dr. Cuba for stage III chronic kidney disease.  Over the past year, review shows a stable creatinine of 1.8.  He returned today for reassessment along with laboratory work.      Rhett in general tells me he is doing well.  He is trying to exercise on a regular basis at the [facility].  While there, he walks about 2 miles a day and is intermittently playing pickle ball.  He denies any chest pain with activity or at rest.  He is not short of breath.  He denies any palpitations, lightheadedness, dizziness, orthopnea or significant peripheral edema.      PHYSICAL EXAMINATION:  His blood pressure today is 120/68, heart rate is 60 beats per minute and is regular.  His lungs are clear.  There is no peripheral edema.  Weight stable at 209 pounds, placing his BMI at 31.      I reviewed his laboratory work  today showing sodium 138, potassium 4.2, BUN of 48, creatinine of 1.8, total cholesterol 108, triglyceride level 99, HDL level 35, LDL level of 53.      IMPRESSION AND PLAN:   1.  Coronary artery disease.  History of stenting to a second VIVIAN in .  He has remaining moderate OM and first VIVIAN disease being medically managed.  He is free from any angina and doing well in followup.  We will continue with current medical regimen including aspirin, beta blocker, ACE inhibitor therapy and the statin.   2.  Hypertension.  Blood pressure now at goal on multiple medications.  Amlodipine was recently added by his primary medical doctor.   3.  Juvenile diabetes.  He has been on insulin pump for many years.  Recent hemoglobin A1c of 7.1%.   4.  Treated mixed hyperlipidemia.  We will continue with atorvastatin and I have renewed this prescription for him today.  He denies any myalgias.   5.  Stage III chronic kidney disease.  Creatinine stable at 1.8.  He is followed by Dr. Cuba.      Thanks for allowing me to participate in this patient's care.  We will have him return next year as planned.         YVETTE BRYANT, CNP             D: 01/15/2019   T: 01/15/2019   MT: RADHA      Name:     TIFFANY LUCERO   MRN:      [id number]        Account:      [id number]   :      1950           Service Date: 01/15/2019      Document: [id number]         Outpatient Encounter Medications as of 1/15/2019   Medication Sig Dispense Refill     allopurinol (ZYLOPRIM) 100 MG tablet Take 100 mg by mouth daily       amLODIPine (NORVASC) 2.5 MG tablet Take 2.5 mg by mouth daily       aspirin (ASPIRIN EC ADULT LOW STRENGTH) 81 MG EC tablet Aspirin 81mg on Monday, Wednesday, Friday 1 tablet 0     atorvastatin (LIPITOR) 80 MG tablet Take 0.5 tablets (40 mg) by mouth daily 45 tablet 3     CALCITRIOL PO Take 0.25 mcg by mouth three times a week.         hydrochlorothiazide (HYDRODIURIL) 25 MG tablet Take 1 tablet by mouth daily. 90 tablet 2      lisinopril (PRINIVIL,ZESTRIL) 20 MG tablet Take 20 mg by mouth daily       metoprolol (TOPROL-XL) 50 MG 24 hr tablet Take 50 mg by mouth daily       QUEtiapine (SEROQUEL) 100 MG tablet Take 100 mg by mouth daily       venlafaxine (EFFEXOR-XR) 150 MG 24 hr capsule Take 150 mg by mouth daily       Cholecalciferol (VITAMIN D3 PO) Take 2,000 Units by mouth daily        Omega-3 Fatty Acids (FISH OIL) 1000 MG CPDR        ondansetron (ZOFRAN ODT) 4 MG ODT tab Take 1 tablet (4 mg) by mouth every 8 hours as needed for nausea (Patient not taking: Reported on 1/15/2019) 10 tablet 0     [DISCONTINUED] atorvastatin (LIPITOR) 80 MG tablet Take 0.5 tablets (40 mg) by mouth daily 45 tablet 0     No facility-administered encounter medications on file as of 1/15/2019.        Again, thank you for allowing me to participate in the care of your patient.      Sincerely,    YVETTE Hanks [facility]

## 2019-04-02 ENCOUNTER — TRANSFERRED RECORDS (OUTPATIENT)
Dept: HEALTH INFORMATION MANAGEMENT | Facility: CLINIC | Age: 69
End: 2019-04-02

## 2019-04-02 ENCOUNTER — DOCUMENTATION ONLY (OUTPATIENT)
Dept: CARDIOLOGY | Facility: CLINIC | Age: 69
End: 2019-04-02

## 2019-10-29 ENCOUNTER — TRANSFERRED RECORDS (OUTPATIENT)
Dept: HEALTH INFORMATION MANAGEMENT | Facility: CLINIC | Age: 69
End: 2019-10-29

## 2020-01-09 ENCOUNTER — TELEPHONE (OUTPATIENT)
Dept: UROLOGY | Facility: CLINIC | Age: 70
End: 2020-01-09

## 2020-01-09 DIAGNOSIS — I25.10 CORONARY ARTERY DISEASE INVOLVING NATIVE CORONARY ARTERY OF NATIVE HEART WITHOUT ANGINA PECTORIS: Primary | ICD-10-CM

## 2020-01-09 NOTE — TELEPHONE ENCOUNTER
[facility] Call Center    Phone Message    May a detailed message be left on voicemail: yes    Reason for Call: Other: Patient called said his last appointment with Darren, he was told to not come in unless he start to have issues. Patient recvd a letter saying to make an appointment, patient said he does not need to be seen.     Action Taken: Other: [facility]

## 2020-01-10 NOTE — TELEPHONE ENCOUNTER
Called and spoke to patient per Dr Banks note seen prn so patient is right he does not need to be seen at this time.Deepthi Silverio, JEMMAN

## 2020-01-20 ENCOUNTER — PRE VISIT (OUTPATIENT)
Dept: CARDIOLOGY | Facility: CLINIC | Age: 70
End: 2020-01-20

## 2020-01-20 NOTE — TELEPHONE ENCOUNTER
Chart Update for OV with Dr. De La Garza scheduled for 1/22/2020.   MARCELLO Robles RN, BSN.  01/20/20 12:09 PM

## 2020-01-22 ENCOUNTER — OFFICE VISIT (OUTPATIENT)
Dept: CARDIOLOGY | Facility: CLINIC | Age: 70
End: 2020-01-22
Payer: COMMERCIAL

## 2020-01-22 VITALS
BODY MASS INDEX: 31.37 KG/M2 | DIASTOLIC BLOOD PRESSURE: 64 MMHG | HEIGHT: 68 IN | HEART RATE: 60 BPM | SYSTOLIC BLOOD PRESSURE: 138 MMHG | WEIGHT: 207 LBS

## 2020-01-22 DIAGNOSIS — N18.30 STAGE 3 CHRONIC KIDNEY DISEASE (H): Chronic | ICD-10-CM

## 2020-01-22 DIAGNOSIS — E78.2 MIXED HYPERLIPIDEMIA: Chronic | ICD-10-CM

## 2020-01-22 DIAGNOSIS — I25.10 CORONARY ARTERY DISEASE INVOLVING NATIVE CORONARY ARTERY OF NATIVE HEART WITHOUT ANGINA PECTORIS: ICD-10-CM

## 2020-01-22 DIAGNOSIS — I25.10 CORONARY ARTERY DISEASE INVOLVING NATIVE CORONARY ARTERY OF NATIVE HEART WITHOUT ANGINA PECTORIS: Primary | ICD-10-CM

## 2020-01-22 DIAGNOSIS — E78.6 HDL DEFICIENCY: ICD-10-CM

## 2020-01-22 DIAGNOSIS — N28.9 RENAL INSUFFICIENCY: ICD-10-CM

## 2020-01-22 LAB
ALT SERPL W P-5'-P-CCNC: 25 U/L (ref 0–70)
ANION GAP SERPL CALCULATED.3IONS-SCNC: 2 MMOL/L (ref 3–14)
BUN SERPL-MCNC: 43 MG/DL (ref 7–30)
CALCIUM SERPL-MCNC: 9.2 MG/DL (ref 8.5–10.1)
CHLORIDE SERPL-SCNC: 106 MMOL/L (ref 94–109)
CHOLEST SERPL-MCNC: 124 MG/DL
CO2 SERPL-SCNC: 30 MMOL/L (ref 20–32)
CREAT SERPL-MCNC: 1.7 MG/DL (ref 0.66–1.25)
GFR SERPL CREATININE-BSD FRML MDRD: 40 ML/MIN/{1.73_M2}
GLUCOSE SERPL-MCNC: 137 MG/DL (ref 70–99)
HDLC SERPL-MCNC: 39 MG/DL
LDLC SERPL CALC-MCNC: 67 MG/DL
NONHDLC SERPL-MCNC: 85 MG/DL
POTASSIUM SERPL-SCNC: 4.4 MMOL/L (ref 3.4–5.3)
SODIUM SERPL-SCNC: 138 MMOL/L (ref 133–144)
TRIGL SERPL-MCNC: 91 MG/DL

## 2020-01-22 PROCEDURE — 84460 ALANINE AMINO (ALT) (SGPT): CPT | Performed by: INTERNAL MEDICINE

## 2020-01-22 PROCEDURE — 80061 LIPID PANEL: CPT | Performed by: INTERNAL MEDICINE

## 2020-01-22 PROCEDURE — 36415 COLL VENOUS BLD VENIPUNCTURE: CPT | Performed by: INTERNAL MEDICINE

## 2020-01-22 PROCEDURE — 99214 OFFICE O/P EST MOD 30 MIN: CPT | Performed by: INTERNAL MEDICINE

## 2020-01-22 PROCEDURE — 80048 BASIC METABOLIC PNL TOTAL CA: CPT | Performed by: INTERNAL MEDICINE

## 2020-01-22 RX ORDER — ATORVASTATIN CALCIUM 80 MG/1
80 TABLET, FILM COATED ORAL DAILY
Qty: 90 TABLET | Refills: 3 | Status: SHIPPED | OUTPATIENT
Start: 2020-01-22 | End: 2020-03-17

## 2020-01-22 ASSESSMENT — MIFFLIN-ST. JEOR: SCORE: 1683.45

## 2020-01-22 NOTE — LETTER
1/22/2020      Radha Joseph NP  [facility] [address] [place] [address]      RE: Rhett Alicia Juares       Dear Colleague,    I had the pleasure of seeing Rhett Juares in the [facility].    Service Date: 01/22/2020      HISTORY OF PRESENT ILLNESS:  Mr. Juares is a very nice 69-year-old gentleman with past medical history significant for juvenile onset diabetes mellitus since age 19, coronary artery disease, hypertension, mixed hyperlipidemia with HDL deficiency, hypertriglyceridemia, all consistent with metabolic syndrome.  He also has chronic renal insufficiency, stage III.      I saw Mr. Juares in clinic in 2012 for a positive stress echo, which led to a 2.25 x 20 mm drug-eluting stent in his second posterolateral branch.  This resulted in complete resolution of his symptoms, and he has done well ever since.  Over the years he has seen multiple cardiologists in our group, and I saw him once again in 2017.      He returns to clinic stating he thinks he is doing quite well.  He has no chest, arm, neck, jaw or shoulder discomfort.  No dyspnea on exertion, orthopnea or PND.  No palpitations, lightheadedness, dizziness, syncope or near-syncope.  He notes no side effects with his current medical regimen.  He states he gets to the club to workout he thinks almost every day, doing a variety of activity including aerobic activity, yoga, Pilates and some resistance activity.  He has no problems or symptoms with any of these.      He also follows with Dr. Cuba for his renal insufficiency.      ASSESSMENT AND PLAN:  Rhett appears to be doing well from a cardiac standpoint without clinical evidence of ischemia, heart failure or significant arrhythmia.      He has no symptoms to suggest heart failure or significant arrhythmia.  He asks about the fact that he has trouble getting his heart rate up when he works out.  It does not appear to  be causing him any symptoms.  Of note, his stress test in  he was only able to achieve 81% of his target heart rate.  We talked about the fact that being on the metoprolol is cardioprotective, and as it does not appear to be causing him any side effects, I would leave him on his metoprolol as is.      Blood pressure is a bit high for him at 138/64 with a pulse of 60.  Normally he is in the 120s.      Weight is 207, which is right in his range, giving a body mass index of 31.5.  I have told him that he needs to try to lose some weight and try to get his weight down at least to his initial goal below 200.      Fasting lipid profile has steadily backslid over the years.  Total cholesterol is 124, HDL is 39, LDL 67, triglycerides are 91.  We talked about the IMPROVE-IT trial, FOURIER and ODYSSEY, and I will increase his atorvastatin from 40 mg to 80 to try to drive LDL lower.  Ideally with the fact that he is a juvenile-onset diabetic, I would like to get his LDL down to 50 or lower.  This may require the addition of Zetia.  Given the fact he has renal insufficiency, I will keep him on atorvastatin as opposed to rosuvastatin.      Basic metabolic profile shows creatinine of 1.7, giving him a GFR of 40, BUN of 43, potassium 4.4 and sodium of 138.      We reviewed his medications.  We will continue them as is.  I will have him follow up with my GINA after his blood work in 1 month to measure the effect of the change in intensifying his atorvastatin.  I will also have him see the GINA in 1 year.  I will see him back in 2.  If he should have any problems, I would be glad to see him sooner.      Thank you for allowing me to participate in his care.         AMARILYS CAMPOS MD, [facility]             D: 2020   T: 2020   MT: RAFY      Name:     TIFFANY LUCERO   MRN:      [id number]        Account:      [id number]   :      1950           Service Date: 2020      Document: [id number]         Outpatient  Encounter Medications as of 1/22/2020   Medication Sig Dispense Refill     allopurinol (ZYLOPRIM) 100 MG tablet Take 100 mg by mouth daily       amLODIPine (NORVASC) 2.5 MG tablet Take 2.5 mg by mouth daily       aspirin (ASPIRIN EC ADULT LOW STRENGTH) 81 MG EC tablet Aspirin 81mg on Monday, Wednesday, Friday 1 tablet 0     atorvastatin (LIPITOR) 80 MG tablet Take 1 tablet (80 mg) by mouth daily 90 tablet 3     CALCITRIOL PO Take 0.25 mcg by mouth three times a week.         hydrochlorothiazide (HYDRODIURIL) 25 MG tablet Take 1 tablet by mouth daily. 90 tablet 2     lisinopril (PRINIVIL,ZESTRIL) 20 MG tablet Take 20 mg by mouth daily       metoprolol (TOPROL-XL) 50 MG 24 hr tablet Take 50 mg by mouth daily       QUEtiapine (SEROQUEL) 100 MG tablet Take 100 mg by mouth daily       venlafaxine (EFFEXOR-XR) 150 MG 24 hr capsule Take 150 mg by mouth daily       Cholecalciferol (VITAMIN D3 PO) Take 2,000 Units by mouth daily        Omega-3 Fatty Acids (FISH OIL) 1000 MG CPDR        ondansetron (ZOFRAN ODT) 4 MG ODT tab Take 1 tablet (4 mg) by mouth every 8 hours as needed for nausea (Patient not taking: Reported on 1/15/2019) 10 tablet 0     [DISCONTINUED] atorvastatin (LIPITOR) 80 MG tablet Take 0.5 tablets (40 mg) by mouth daily 45 tablet 3     No facility-administered encounter medications on file as of 1/22/2020.        Again, thank you for allowing me to participate in the care of your patient.      Sincerely,    Kd De La Garza MD     [facility]

## 2020-01-22 NOTE — PROGRESS NOTES
Service Date: 01/22/2020      HISTORY OF PRESENT ILLNESS:  Mr. Juares is a very nice 69-year-old gentleman with past medical history significant for juvenile onset diabetes mellitus since age 19, coronary artery disease, hypertension, mixed hyperlipidemia with HDL deficiency, hypertriglyceridemia, all consistent with metabolic syndrome.  He also has chronic renal insufficiency, stage III.      I saw Mr. Juares in clinic in 2012 for a positive stress echo, which led to a 2.25 x 20 mm drug-eluting stent in his second posterolateral branch.  This resulted in complete resolution of his symptoms, and he has done well ever since.  Over the years he has seen multiple cardiologists in our group, and I saw him once again in 2017.      He returns to clinic stating he thinks he is doing quite well.  He has no chest, arm, neck, jaw or shoulder discomfort.  No dyspnea on exertion, orthopnea or PND.  No palpitations, lightheadedness, dizziness, syncope or near-syncope.  He notes no side effects with his current medical regimen.  He states he gets to the club to workout he thinks almost every day, doing a variety of activity including aerobic activity, yoga, Pilates and some resistance activity.  He has no problems or symptoms with any of these.      He also follows with Dr. Cuba for his renal insufficiency.      ASSESSMENT AND PLAN:  Rhett appears to be doing well from a cardiac standpoint without clinical evidence of ischemia, heart failure or significant arrhythmia.      He has no symptoms to suggest heart failure or significant arrhythmia.  He asks about the fact that he has trouble getting his heart rate up when he works out.  It does not appear to be causing him any symptoms.  Of note, his stress test in 2012 he was only able to achieve 81% of his target heart rate.  We talked about the fact that being on the metoprolol is cardioprotective, and as it does not appear to be causing him any side effects, I would leave him  on his metoprolol as is.      Blood pressure is a bit high for him at 138/64 with a pulse of 60.  Normally he is in the 120s.      Weight is 207, which is right in his range, giving a body mass index of 31.5.  I have told him that he needs to try to lose some weight and try to get his weight down at least to his initial goal below 200.      Fasting lipid profile has steadily backslid over the years.  Total cholesterol is 124, HDL is 39, LDL 67, triglycerides are 91.  We talked about the IMPROVE-IT trial, FOURIER and ODYSSEY, and I will increase his atorvastatin from 40 mg to 80 to try to drive LDL lower.  Ideally with the fact that he is a juvenile-onset diabetic, I would like to get his LDL down to 50 or lower.  This may require the addition of Zetia.  Given the fact he has renal insufficiency, I will keep him on atorvastatin as opposed to rosuvastatin.      Basic metabolic profile shows creatinine of 1.7, giving him a GFR of 40, BUN of 43, potassium 4.4 and sodium of 138.      We reviewed his medications.  We will continue them as is.  I will have him follow up with my GINA after his blood work in 1 month to measure the effect of the change in intensifying his atorvastatin.  I will also have him see the GINA in 1 year.  I will see him back in 2.  If he should have any problems, I would be glad to see him sooner.      Thank you for allowing me to participate in his care.         AMARILYS CAMPOS MD, [facility]             D: 2020   T: 2020   MT: RAFY      Name:     TIFFANY LUCERO   MRN:      [id number]        Account:      [id number]   :      1950           Service Date: 2020      Document: [id number]

## 2020-01-22 NOTE — PROGRESS NOTES
HPI and Plan:   See dictation    Orders Placed This Encounter   Procedures     Lipid Profile     ALT     Basic metabolic panel     Lipid Profile     Follow-Up with Cardiac Advanced Practice Provider     Follow-Up with Cardiac Advanced Practice Provider     Follow-Up with Cardiologist       Orders Placed This Encounter   Medications     atorvastatin (LIPITOR) 80 MG tablet     Sig: Take 1 tablet (80 mg) by mouth daily     Dispense:  90 tablet     Refill:  3       Medications Discontinued During This Encounter   Medication Reason     atorvastatin (LIPITOR) 80 MG tablet          Encounter Diagnoses   Name Primary?     Coronary artery disease involving native coronary artery of native heart without angina pectoris Yes     HDL deficiency      Mixed hyperlipidemia      Stage 3 chronic kidney disease (H)      Renal insufficiency        CURRENT MEDICATIONS:  Current Outpatient Medications   Medication Sig Dispense Refill     allopurinol (ZYLOPRIM) 100 MG tablet Take 100 mg by mouth daily       amLODIPine (NORVASC) 2.5 MG tablet Take 2.5 mg by mouth daily       aspirin (ASPIRIN EC ADULT LOW STRENGTH) 81 MG EC tablet Aspirin 81mg on Monday, Wednesday, Friday 1 tablet 0     atorvastatin (LIPITOR) 80 MG tablet Take 1 tablet (80 mg) by mouth daily 90 tablet 3     CALCITRIOL PO Take 0.25 mcg by mouth three times a week.         hydrochlorothiazide (HYDRODIURIL) 25 MG tablet Take 1 tablet by mouth daily. 90 tablet 2     lisinopril (PRINIVIL,ZESTRIL) 20 MG tablet Take 20 mg by mouth daily       metoprolol (TOPROL-XL) 50 MG 24 hr tablet Take 50 mg by mouth daily       QUEtiapine (SEROQUEL) 100 MG tablet Take 100 mg by mouth daily       venlafaxine (EFFEXOR-XR) 150 MG 24 hr capsule Take 150 mg by mouth daily       Cholecalciferol (VITAMIN D3 PO) Take 2,000 Units by mouth daily        Omega-3 Fatty Acids (FISH OIL) 1000 MG CPDR        ondansetron (ZOFRAN ODT) 4 MG ODT tab Take 1 tablet (4 mg) by mouth every 8 hours as needed for nausea  (Patient not taking: Reported on 1/15/2019) 10 tablet 0       ALLERGIES     Allergies   Allergen Reactions     Carbidopa-Levodopa Nausea and Vomiting     Penicillin G        PAST MEDICAL HISTORY:  Past Medical History:   Diagnosis Date     Chronic kidney disease 07/06/2015    managed by Dr. Culver     Coronary artery disease involving native coronary artery of native heart without angina pectoris 09/2012 2012 - NIECY to posterolateral branch      Diabetes mellitus (H)     insulin pump, managed by Dr. Guajardo     GI bleed     coffee-ground emesis Oct 2018     Gout      Hypertension      Lipoprotein deficiencies      Parkinsonism (H) 12/4/2014     Pure hypercholesterolemia      S/P coronary artery stent placement 12/3/2014     Tremor 12/3/2014       PAST SURGICAL HISTORY:  Past Surgical History:   Procedure Laterality Date     HEART CATH, ANGIOPLASTY  9/14/12    second posterolateral branch off the dominant right coronary artery,       FAMILY HISTORY:  Family History   Problem Relation Age of Onset     Heart Disease Mother      Neurologic Disorder Father         Tremors at age of 70/Seizures       SOCIAL HISTORY:  Social History     Socioeconomic History     Marital status:      Spouse name: None     Number of children: None     Years of education: None     Highest education level: None   Occupational History     None   Social Needs     Financial resource strain: None     Food insecurity:     Worry: None     Inability: None     Transportation needs:     Medical: None     Non-medical: None   Tobacco Use     Smoking status: Never Smoker     Smokeless tobacco: Never Used   Substance and Sexual Activity     Alcohol use: Yes     Alcohol/week: 0.0 standard drinks     Comment: occasional beer     Drug use: No     Sexual activity: None     Comment: not assessed   Lifestyle     Physical activity:     Days per week: None     Minutes per session: None     Stress: None   Relationships     Social connections:     Talks on  "phone: None     Gets together: None     Attends Hindu service: None     Active member of club or organization: None     Attends meetings of clubs or organizations: None     Relationship status: None     Intimate partner violence:     Fear of current or ex partner: None     Emotionally abused: None     Physically abused: None     Forced sexual activity: None   Other Topics Concern     Parent/sibling w/ CABG, MI or angioplasty before 65F 55M? 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            FH: family history includes Heart in his mother; Neurological in his father and father.       Review of Systems:  Skin:  Negative       Eyes:  Positive for glasses    ENT:  Positive for hearing loss    Respiratory:  Positive for sleep apnea;CPAP     Cardiovascular:    edema;Positive for;lightheadedness left ankle - always has edema  Gastroenterology: Positive for constipation;diarrhea    Genitourinary:  Positive for prostate problem;urinary frequency    Musculoskeletal:  Negative for      Neurologic:  Positive for numbness or tingling of hands    Psychiatric:  Negative for      Heme/Lymph/Imm:  Negative      Endocrine:  Positive for diabetes      Physical Exam:  Vitals: /64   Pulse 60   Ht 1.727 m (5' 8\")   Wt 93.9 kg " (207 lb)   BMI 31.47 kg/m      Constitutional:  cooperative, alert and oriented, well developed, well nourished, in no acute distress overweight      Skin:  warm and dry to the touch, no apparent skin lesions or masses noted          Head:  normocephalic, no masses or lesions        Eyes:  pupils equal and round, conjunctivae and lids unremarkable, sclera white, no xanthalasma, EOMS intact, no nystagmus        Lymph:      ENT:  no pallor or cyanosis, dentition good        Neck:  no carotid bruit;carotid pulses are full and equal bilaterally        Respiratory:  normal breath sounds, clear to auscultation, normal A-P diameter, normal symmetry, normal respiratory excursion, no use of accessory muscles         Cardiac: regular rhythm;normal S1 and S2     no presence of murmur          pulses full and equal                                        GI:    obese      Extremities and Muscular Skeletal:  normal muscle strength and tone;no spinal abnormalities noted       LLE edema;trace      Neurological:  no gross motor deficits        Psych:  affect appropriate, oriented to time, person and place        CC  No referring provider defined for this encounter.

## 2020-01-22 NOTE — LETTER
1/22/2020    Radha Joseph NP  [facility] [address] [place] [address]    RE: Rhett Juares       Dear Colleague,    I had the pleasure of seeing Rhett Juares in the [facility].    HPI and Plan:   See dictation    Orders Placed This Encounter   Procedures     Lipid Profile     ALT     Basic metabolic panel     Lipid Profile     Follow-Up with Cardiac Advanced Practice Provider     Follow-Up with Cardiac Advanced Practice Provider     Follow-Up with Cardiologist       Orders Placed This Encounter   Medications     atorvastatin (LIPITOR) 80 MG tablet     Sig: Take 1 tablet (80 mg) by mouth daily     Dispense:  90 tablet     Refill:  3       Medications Discontinued During This Encounter   Medication Reason     atorvastatin (LIPITOR) 80 MG tablet          Encounter Diagnoses   Name Primary?     Coronary artery disease involving native coronary artery of native heart without angina pectoris Yes     HDL deficiency      Mixed hyperlipidemia      Stage 3 chronic kidney disease (H)      Renal insufficiency        CURRENT MEDICATIONS:  Current Outpatient Medications   Medication Sig Dispense Refill     allopurinol (ZYLOPRIM) 100 MG tablet Take 100 mg by mouth daily       amLODIPine (NORVASC) 2.5 MG tablet Take 2.5 mg by mouth daily       aspirin (ASPIRIN EC ADULT LOW STRENGTH) 81 MG EC tablet Aspirin 81mg on Monday, Wednesday, Friday 1 tablet 0     atorvastatin (LIPITOR) 80 MG tablet Take 1 tablet (80 mg) by mouth daily 90 tablet 3     CALCITRIOL PO Take 0.25 mcg by mouth three times a week.         hydrochlorothiazide (HYDRODIURIL) 25 MG tablet Take 1 tablet by mouth daily. 90 tablet 2     lisinopril (PRINIVIL,ZESTRIL) 20 MG tablet Take 20 mg by mouth daily       metoprolol (TOPROL-XL) 50 MG 24 hr tablet Take 50 mg by mouth daily       QUEtiapine (SEROQUEL) 100 MG tablet Take 100 mg by mouth daily       venlafaxine (EFFEXOR-XR) 150  MG 24 hr capsule Take 150 mg by mouth daily       Cholecalciferol (VITAMIN D3 PO) Take 2,000 Units by mouth daily        Omega-3 Fatty Acids (FISH OIL) 1000 MG CPDR        ondansetron (ZOFRAN ODT) 4 MG ODT tab Take 1 tablet (4 mg) by mouth every 8 hours as needed for nausea (Patient not taking: Reported on 1/15/2019) 10 tablet 0       ALLERGIES     Allergies   Allergen Reactions     Carbidopa-Levodopa Nausea and Vomiting     Penicillin G        PAST MEDICAL HISTORY:  Past Medical History:   Diagnosis Date     Chronic kidney disease 07/06/2015    managed by Dr. Culver     Coronary artery disease involving native coronary artery of native heart without angina pectoris 09/2012 2012 - NIECY to posterolateral branch      Diabetes mellitus (H)     insulin pump, managed by Dr. Guajardo     GI bleed     coffee-ground emesis Oct 2018     Gout      Hypertension      Lipoprotein deficiencies      Parkinsonism (H) 12/4/2014     Pure hypercholesterolemia      S/P coronary artery stent placement 12/3/2014     Tremor 12/3/2014       PAST SURGICAL HISTORY:  Past Surgical History:   Procedure Laterality Date     HEART CATH, ANGIOPLASTY  9/14/12    second posterolateral branch off the dominant right coronary artery,       FAMILY HISTORY:  Family History   Problem Relation Age of Onset     Heart Disease Mother      Neurologic Disorder Father         Tremors at age of 70/Seizures       SOCIAL HISTORY:  Social History     Socioeconomic History     Marital status:      Spouse name: None     Number of children: None     Years of education: None     Highest education level: None   Occupational History     None   Social Needs     Financial resource strain: None     Food insecurity:     Worry: None     Inability: None     Transportation needs:     Medical: None     Non-medical: None   Tobacco Use     Smoking status: Never Smoker     Smokeless tobacco: Never Used   Substance and Sexual Activity     Alcohol use: Yes     Alcohol/week: 0.0  standard drinks     Comment: occasional beer     Drug use: No     Sexual activity: None     Comment: not assessed   Lifestyle     Physical activity:     Days per week: None     Minutes per session: None     Stress: None   Relationships     Social connections:     Talks on phone: None     Gets together: None     Attends Jain service: None     Active member of club or organization: None     Attends meetings of clubs or organizations: None     Relationship status: None     Intimate partner violence:     Fear of current or ex partner: None     Emotionally abused: None     Physically abused: None     Forced sexual activity: None   Other Topics Concern     Parent/sibling w/ CABG, MI or angioplasty before 65F 55M? 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            FH: family history includes Heart in his mother; Neurological in his father and father.       Review of Systems:  Skin:  Negative       Eyes:  Positive for glasses    ENT:  Positive for hearing loss    Respiratory:  Positive for sleep apnea;CPAP     Cardiovascular:    edema;Positive for;lightheadedness left ankle - always has edema  Gastroenterology: Positive for constipation;diarrhea    Genitourinary:  Positive for prostate problem;urinary frequency   "  Musculoskeletal:  Negative for      Neurologic:  Positive for numbness or tingling of hands    Psychiatric:  Negative for      Heme/Lymph/Imm:  Negative      Endocrine:  Positive for diabetes      Physical Exam:  Vitals: /64   Pulse 60   Ht 1.727 m (5' 8\")   Wt 93.9 kg (207 lb)   BMI 31.47 kg/m       Constitutional:  cooperative, alert and oriented, well developed, well nourished, in no acute distress overweight      Skin:  warm and dry to the touch, no apparent skin lesions or masses noted          Head:  normocephalic, no masses or lesions        Eyes:  pupils equal and round, conjunctivae and lids unremarkable, sclera white, no xanthalasma, EOMS intact, no nystagmus        Lymph:      ENT:  no pallor or cyanosis, dentition good        Neck:  no carotid bruit;carotid pulses are full and equal bilaterally        Respiratory:  normal breath sounds, clear to auscultation, normal A-P diameter, normal symmetry, normal respiratory excursion, no use of accessory muscles         Cardiac: regular rhythm;normal S1 and S2     no presence of murmur          pulses full and equal                                        GI:    obese      Extremities and Muscular Skeletal:  normal muscle strength and tone;no spinal abnormalities noted       LLE edema;trace      Neurological:  no gross motor deficits        Psych:  affect appropriate, oriented to time, person and place        CC  No referring provider defined for this encounter.                Thank you for allowing me to participate in the care of your patient.      Sincerely,     Kd De La Garza MD     [facility]    cc:   No referring provider defined for this encounter.        "

## 2020-02-25 NOTE — PROGRESS NOTES
HPI:  Rhett Juares is a 70 year old male who presents to discuss medication changes and review lipid panel.   He is a patient of Dr. De La Garza and his past medical history includes    1. Coronary artery disease.  S/p NIECY to second posterolateral branch (2012)  2. Hypertension   3. juvenile onset diabetes  4. Metabolic syndrome.  HDL deficiency and hypertriglyceridemia   5. Chronic renal disease.  Stage III      In 2012, patient had a positive stress echo resulting in a drug-eluting stent in the second posterolateral branch which resolved his symptoms.    In January 2020, patient saw Dr. De La Garza and was overall doing well.   He recommended decreasing patient's LDL to less than 50 and increased his atorvastatin from 40 to 80 mg daily with the recommendation that if it did not improve his rate lipid function Zetia should be added.  Weight loss was also discussed.      Today he presents feeling well with no CV complaints.  He is exercising regularly at the [facility] with no complaints of chest pain or pressure, dizziness, syncope, dyspnea at rest or with exertion, palpitations, orthopnea, or PND.  He increased his Lipitor from 40 mg to 80 mg daily with no complaints.        ASSESSMENT AND PLAN    Coronary artery disease    S/p NIECY to second posterolateral branch (2012)    He is currently asymptomatic and is exercising regularly - He is currently taking an aspirin 81 mg daily, metoprolol XL 50 mg daily and lisinopril 20 mg daily     He is currently taking atorvastatin 80 mg daily and fish oil.  LDL today was 52,  Will add Zetia 10 mg daily with a repeat lipids in 8 weeks.     Lifestyle interventions of weight loss were discussed today along with Mediterranean diet.     Hypertension     Controlled with amlodipine, hydrochlorothiazide, lisinopril and metoprolol.    Plan:    Start Zetia 10 mg daily and follow up in 2 months with lipids and visit    Follow up in 1 year with GINA    Follow up in 2 years with   Frederic    Encouraged weight loss.     Patient expresses understanding and agreement with the plan.     I appreciate the chance to help with Rhett Juares Please let me know if you have any questions or concerns.    YVETTE Patel, CNP    This note was completed in part using Dragon voice recognition software. Although reviewed after completion, some word and grammatical errors may occur.    Orders Placed This Encounter   Procedures     Lipid panel reflex to direct LDL Fasting     Follow-Up with Cardiac Advanced Practice Provider     Orders Placed This Encounter   Medications     ezetimibe (ZETIA) 10 MG tablet     Sig: Take 1 tablet (10 mg) by mouth daily     Dispense:  30 tablet     Refill:  11     There are no discontinued medications.      Encounter Diagnosis   Name Primary?     Coronary artery disease involving native coronary artery of native heart without angina pectoris        CURRENT MEDICATIONS:  Current Outpatient Medications   Medication Sig Dispense Refill     allopurinol (ZYLOPRIM) 100 MG tablet Take 100 mg by mouth daily       amLODIPine (NORVASC) 2.5 MG tablet Take 2.5 mg by mouth daily       aspirin (ASPIRIN EC ADULT LOW STRENGTH) 81 MG EC tablet Aspirin 81mg on Monday, Wednesday, Friday 1 tablet 0     atorvastatin (LIPITOR) 80 MG tablet Take 1 tablet (80 mg) by mouth daily 90 tablet 3     CALCITRIOL PO Take 0.25 mcg by mouth three times a week.         ezetimibe (ZETIA) 10 MG tablet Take 1 tablet (10 mg) by mouth daily 30 tablet 11     hydrochlorothiazide (HYDRODIURIL) 25 MG tablet Take 1 tablet by mouth daily. 90 tablet 2     lisinopril (PRINIVIL,ZESTRIL) 20 MG tablet Take 20 mg by mouth daily       metoprolol (TOPROL-XL) 50 MG 24 hr tablet Take 50 mg by mouth daily       QUEtiapine (SEROQUEL) 100 MG tablet Take 100 mg by mouth daily       venlafaxine (EFFEXOR-XR) 150 MG 24 hr capsule Take 150 mg by mouth daily       Cholecalciferol (VITAMIN D3 PO) Take 2,000 Units by mouth daily         Omega-3 Fatty Acids (FISH OIL) 1000 MG CPDR        ondansetron (ZOFRAN ODT) 4 MG ODT tab Take 1 tablet (4 mg) by mouth every 8 hours as needed for nausea (Patient not taking: Reported on 1/15/2019) 10 tablet 0       ALLERGIES     Allergies   Allergen Reactions     Carbidopa-Levodopa Nausea and Vomiting     Penicillin G        PAST MEDICAL HISTORY:  Past Medical History:   Diagnosis Date     Chronic kidney disease 07/06/2015    managed by Dr. Culver     Coronary artery disease involving native coronary artery of native heart without angina pectoris 09/2012 2012 - NIECY to posterolateral branch      Diabetes mellitus (H)     insulin pump, managed by Dr. Guajardo     GI bleed     coffee-ground emesis Oct 2018     Gout      Hypertension      Lipoprotein deficiencies      Parkinsonism (H) 12/4/2014     Pure hypercholesterolemia      S/P coronary artery stent placement 12/3/2014     Tremor 12/3/2014       PAST SURGICAL HISTORY:  Past Surgical History:   Procedure Laterality Date     HEART CATH, ANGIOPLASTY  9/14/12    second posterolateral branch off the dominant right coronary artery,       FAMILY HISTORY:  Family History   Problem Relation Age of Onset     Heart Disease Mother      Neurologic Disorder Father         Tremors at age of 70/Seizures       SOCIAL HISTORY:  Social History     Socioeconomic History     Marital status:      Spouse name: None     Number of children: None     Years of education: None     Highest education level: None   Occupational History     None   Social Needs     Financial resource strain: None     Food insecurity:     Worry: None     Inability: None     Transportation needs:     Medical: None     Non-medical: None   Tobacco Use     Smoking status: Never Smoker     Smokeless tobacco: Never Used   Substance and Sexual Activity     Alcohol use: Yes     Alcohol/week: 0.0 standard drinks     Comment: occasional beer     Drug use: No     Sexual activity: None     Comment: not assessed  "  Lifestyle     Physical activity:     Days per week: None     Minutes per session: None     Stress: None   Relationships     Social connections:     Talks on phone: None     Gets together: None     Attends [employer] service: None     Active member of club or organization: None     Attends meetings of clubs or organizations: None     Relationship status: None     Intimate partner violence:     Fear of current or ex partner: None     Emotionally abused: None     Physically abused: None     Forced sexual activity: None   Other Topics Concern     Parent/sibling w/ CABG, MI or angioplasty before 65F 55M? 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            FH: family history includes Heart in his mother; Neurological in his father and father.       Review of Systems:  Skin:  Negative for     Eyes:  Positive for glasses  ENT:  Positive for    Respiratory:  Positive for sleep apnea;CPAP  Cardiovascular:    edema;Positive for  Gastroenterology: Positive for constipation  Genitourinary:  not assessed    Musculoskeletal:  Negative for    Neurologic:  Positive for    Psychiatric:  Negative for    Heme/Lymph/Imm:  Negative    Endocrine:  Positive for diabetes    Physical Exam:  Vitals: /72   Pulse 59   Ht 1.727 m (5' 8\")   " Wt 92.1 kg (203 lb)   BMI 30.87 kg/m      Constitutional:  cooperative, alert and oriented, well developed, well nourished, in no acute distress overweight      Skin:  warm and dry to the touch, no apparent skin lesions or masses noted        Head:  normocephalic, no masses or lesions        Eyes:  pupils equal and round, conjunctivae and lids unremarkable, sclera white, no xanthalasma, EOMS intact, no nystagmus        ENT:  no pallor or cyanosis, dentition good        Neck:  JVP normal        Chest:  normal breath sounds, clear to auscultation, normal A-P diameter, normal symmetry, normal respiratory excursion, no use of accessory muscles        Cardiac: regular rhythm;normal S1 and S2                  Abdomen:  abdomen soft obese      Vascular: pulses full and equal     right radial artery;2+             left radial artery;2+                  Extremities and Back:  normal muscle strength and tone;no spinal abnormalities noted        Neurological:  no gross motor deficits          Recent Lab Results:  LIPID RESULTS:  Lab Results   Component Value Date    CHOL 108 02/26/2020    HDL 39 (L) 02/26/2020    LDL 52 02/26/2020    TRIG 85 02/26/2020    CHOLHDLRATIO 3.1 10/13/2015       LIVER ENZYME RESULTS:  Lab Results   Component Value Date    AST 15 10/02/2018    ALT 24 02/26/2020       CBC RESULTS:  Lab Results   Component Value Date    WBC 10.8 10/02/2018    RBC 5.30 10/02/2018    HGB 14.9 10/02/2018    HCT 46.1 10/02/2018    MCV 87 10/02/2018    MCH 28.1 10/02/2018    MCHC 32.3 10/02/2018    RDW 15.2 (H) 10/02/2018     10/02/2018       BMP RESULTS:  Lab Results   Component Value Date     01/22/2020    POTASSIUM 4.4 01/22/2020    CHLORIDE 106 01/22/2020    CO2 30 01/22/2020    ANIONGAP 2 (L) 01/22/2020     (H) 01/22/2020    BUN 43 (H) 01/22/2020    CR 1.70 (H) 01/22/2020    GFRESTIMATED 40 (L) 01/22/2020    GFRESTBLACK 47 (L) 01/22/2020    YVONNE 9.2 01/22/2020        A1C RESULTS:  Lab Results    Component Value Date    A1C 7.7 (A) 08/09/2018       INR RESULTS:  Lab Results   Component Value Date    INR 0.98 09/10/2012           CC  Kd De La Garza MD  [id number]  [place] [id number]

## 2020-02-26 ENCOUNTER — OFFICE VISIT (OUTPATIENT)
Dept: CARDIOLOGY | Facility: CLINIC | Age: 70
End: 2020-02-26
Attending: INTERNAL MEDICINE
Payer: COMMERCIAL

## 2020-02-26 VITALS
BODY MASS INDEX: 30.77 KG/M2 | HEIGHT: 68 IN | WEIGHT: 203 LBS | SYSTOLIC BLOOD PRESSURE: 127 MMHG | HEART RATE: 59 BPM | DIASTOLIC BLOOD PRESSURE: 72 MMHG

## 2020-02-26 DIAGNOSIS — I25.10 CORONARY ARTERY DISEASE INVOLVING NATIVE CORONARY ARTERY OF NATIVE HEART WITHOUT ANGINA PECTORIS: ICD-10-CM

## 2020-02-26 LAB
ALT SERPL W P-5'-P-CCNC: 24 U/L (ref 0–70)
CHOLEST SERPL-MCNC: 108 MG/DL
HDLC SERPL-MCNC: 39 MG/DL
LDLC SERPL CALC-MCNC: 52 MG/DL
NONHDLC SERPL-MCNC: 69 MG/DL
TRIGL SERPL-MCNC: 85 MG/DL

## 2020-02-26 PROCEDURE — 80061 LIPID PANEL: CPT | Performed by: INTERNAL MEDICINE

## 2020-02-26 PROCEDURE — 36415 COLL VENOUS BLD VENIPUNCTURE: CPT | Performed by: INTERNAL MEDICINE

## 2020-02-26 PROCEDURE — 99214 OFFICE O/P EST MOD 30 MIN: CPT | Performed by: NURSE PRACTITIONER

## 2020-02-26 PROCEDURE — 84460 ALANINE AMINO (ALT) (SGPT): CPT | Performed by: INTERNAL MEDICINE

## 2020-02-26 RX ORDER — EZETIMIBE 10 MG/1
10 TABLET ORAL DAILY
Qty: 30 TABLET | Refills: 11 | Status: SHIPPED | OUTPATIENT
Start: 2020-02-26 | End: 2020-04-29

## 2020-02-26 ASSESSMENT — MIFFLIN-ST. JEOR: SCORE: 1660.3

## 2020-02-26 NOTE — LETTER
2/26/2020    Radha Joseph NP  [facility] [address] [place] [address]    RE: Rhett Juares       Dear Colleague,    I had the pleasure of seeing Rhett Juares in the [facility].    HPI:  Rhett Juares is a 70 year old male who presents to discuss medication changes and review lipid panel.   He is a patient of Dr. De La Garza and his past medical history includes    1. Coronary artery disease.  S/p NIECY to second posterolateral branch (2012)  2. Hypertension   3. juvenile onset diabetes  4. Metabolic syndrome.  HDL deficiency and hypertriglyceridemia   5. Chronic renal disease.  Stage III      In 2012, patient had a positive stress echo resulting in a drug-eluting stent in the second posterolateral branch which resolved his symptoms.    In January 2020, patient saw Dr. De La Garza and was overall doing well.   He recommended decreasing patient's LDL to less than 50 and increased his atorvastatin from 40 to 80 mg daily with the recommendation that if it did not improve his rate lipid function Zetia should be added.  Weight loss was also discussed.      Today he presents feeling well with no CV complaints.  He is exercising regularly at the [facility] with no complaints of chest pain or pressure, dizziness, syncope, dyspnea at rest or with exertion, palpitations, orthopnea, or PND.  He increased his Lipitor from 40 mg to 80 mg daily with no complaints.        ASSESSMENT AND PLAN    Coronary artery disease    S/p NIECY to second posterolateral branch (2012)    He is currently asymptomatic and is exercising regularly - He is currently taking an aspirin 81 mg daily, metoprolol XL 50 mg daily and lisinopril 20 mg daily     He is currently taking atorvastatin 80 mg daily and fish oil.  LDL today was 52,  Will add Zetia 10 mg daily with a repeat lipids in 8 weeks.     Lifestyle interventions of weight loss were discussed today along with  Mediterranean diet.     Hypertension     Controlled with amlodipine, hydrochlorothiazide, lisinopril and metoprolol.    Plan:    Start Zetia 10 mg daily and follow up in 2 months with lipids and visit    Follow up in 1 year with GINA    Follow up in 2 years with DR. De La Garza    Encouraged weight loss.     Patient expresses understanding and agreement with the plan.     I appreciate the chance to help with Rhett Juares Please let me know if you have any questions or concerns.    Ghazal Hanks, APRN, CNP    This note was completed in part using Dragon voice recognition software. Although reviewed after completion, some word and grammatical errors may occur.    Orders Placed This Encounter   Procedures     Lipid panel reflex to direct LDL Fasting     Follow-Up with Cardiac Advanced Practice Provider     Orders Placed This Encounter   Medications     ezetimibe (ZETIA) 10 MG tablet     Sig: Take 1 tablet (10 mg) by mouth daily     Dispense:  30 tablet     Refill:  11     There are no discontinued medications.      Encounter Diagnosis   Name Primary?     Coronary artery disease involving native coronary artery of native heart without angina pectoris        CURRENT MEDICATIONS:  Current Outpatient Medications   Medication Sig Dispense Refill     allopurinol (ZYLOPRIM) 100 MG tablet Take 100 mg by mouth daily       amLODIPine (NORVASC) 2.5 MG tablet Take 2.5 mg by mouth daily       aspirin (ASPIRIN EC ADULT LOW STRENGTH) 81 MG EC tablet Aspirin 81mg on Monday, Wednesday, Friday 1 tablet 0     atorvastatin (LIPITOR) 80 MG tablet Take 1 tablet (80 mg) by mouth daily 90 tablet 3     CALCITRIOL PO Take 0.25 mcg by mouth three times a week.         ezetimibe (ZETIA) 10 MG tablet Take 1 tablet (10 mg) by mouth daily 30 tablet 11     hydrochlorothiazide (HYDRODIURIL) 25 MG tablet Take 1 tablet by mouth daily. 90 tablet 2     lisinopril (PRINIVIL,ZESTRIL) 20 MG tablet Take 20 mg by mouth daily       metoprolol (TOPROL-XL)  50 MG 24 hr tablet Take 50 mg by mouth daily       QUEtiapine (SEROQUEL) 100 MG tablet Take 100 mg by mouth daily       venlafaxine (EFFEXOR-XR) 150 MG 24 hr capsule Take 150 mg by mouth daily       Cholecalciferol (VITAMIN D3 PO) Take 2,000 Units by mouth daily        Omega-3 Fatty Acids (FISH OIL) 1000 MG CPDR        ondansetron (ZOFRAN ODT) 4 MG ODT tab Take 1 tablet (4 mg) by mouth every 8 hours as needed for nausea (Patient not taking: Reported on 1/15/2019) 10 tablet 0       ALLERGIES     Allergies   Allergen Reactions     Carbidopa-Levodopa Nausea and Vomiting     Penicillin G        PAST MEDICAL HISTORY:  Past Medical History:   Diagnosis Date     Chronic kidney disease 07/06/2015    managed by Dr. Culver     Coronary artery disease involving native coronary artery of native heart without angina pectoris 09/2012 2012 - NIECY to posterolateral branch      Diabetes mellitus (H)     insulin pump, managed by Dr. Guajardo     GI bleed     coffee-ground emesis Oct 2018     Gout      Hypertension      Lipoprotein deficiencies      Parkinsonism (H) 12/4/2014     Pure hypercholesterolemia      S/P coronary artery stent placement 12/3/2014     Tremor 12/3/2014       PAST SURGICAL HISTORY:  Past Surgical History:   Procedure Laterality Date     HEART CATH, ANGIOPLASTY  9/14/12    second posterolateral branch off the dominant right coronary artery,       FAMILY HISTORY:  Family History   Problem Relation Age of Onset     Heart Disease Mother      Neurologic Disorder Father         Tremors at age of 70/Seizures       SOCIAL HISTORY:  Social History     Socioeconomic History     Marital status:      Spouse name: None     Number of children: None     Years of education: None     Highest education level: None   Occupational History     None   Social Needs     Financial resource strain: None     Food insecurity:     Worry: None     Inability: None     Transportation needs:     Medical: None     Non-medical: None    Tobacco Use     Smoking status: Never Smoker     Smokeless tobacco: Never Used   Substance and Sexual Activity     Alcohol use: Yes     Alcohol/week: 0.0 standard drinks     Comment: occasional beer     Drug use: No     Sexual activity: None     Comment: not assessed   Lifestyle     Physical activity:     Days per week: None     Minutes per session: None     Stress: None   Relationships     Social connections:     Talks on phone: None     Gets together: None     Attends Congregational service: None     Active member of club or organization: None     Attends meetings of clubs or organizations: None     Relationship status: None     Intimate partner violence:     Fear of current or ex partner: None     Emotionally abused: None     Physically abused: None     Forced sexual activity: None   Other Topics Concern     Parent/sibling w/ CABG, MI or angioplasty before 65F 55M? 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            FH: family history includes Heart in his mother; Neurological in his father and father.       Review of Systems:  Skin:  Negative for     Eyes:  Positive for glasses  ENT:  Positive for    Respiratory:  Positive for sleep apnea;CPAP  Cardiovascular:    edema;Positive for  Gastroenterology: Positive for  "constipation  Genitourinary:  not assessed    Musculoskeletal:  Negative for    Neurologic:  Positive for    Psychiatric:  Negative for    Heme/Lymph/Imm:  Negative    Endocrine:  Positive for diabetes    Physical Exam:  Vitals: /72   Pulse 59   Ht 1.727 m (5' 8\")   Wt 92.1 kg (203 lb)   BMI 30.87 kg/m       Constitutional:  cooperative, alert and oriented, well developed, well nourished, in no acute distress overweight      Skin:  warm and dry to the touch, no apparent skin lesions or masses noted        Head:  normocephalic, no masses or lesions        Eyes:  pupils equal and round, conjunctivae and lids unremarkable, sclera white, no xanthalasma, EOMS intact, no nystagmus        ENT:  no pallor or cyanosis, dentition good        Neck:  JVP normal        Chest:  normal breath sounds, clear to auscultation, normal A-P diameter, normal symmetry, normal respiratory excursion, no use of accessory muscles        Cardiac: regular rhythm;normal S1 and S2                  Abdomen:  abdomen soft obese      Vascular: pulses full and equal     right radial artery;2+             left radial artery;2+                  Extremities and Back:  normal muscle strength and tone;no spinal abnormalities noted        Neurological:  no gross motor deficits          Recent Lab Results:  LIPID RESULTS:  Lab Results   Component Value Date    CHOL 108 02/26/2020    HDL 39 (L) 02/26/2020    LDL 52 02/26/2020    TRIG 85 02/26/2020    CHOLHDLRATIO 3.1 10/13/2015       LIVER ENZYME RESULTS:  Lab Results   Component Value Date    AST 15 10/02/2018    ALT 24 02/26/2020       CBC RESULTS:  Lab Results   Component Value Date    WBC 10.8 10/02/2018    RBC 5.30 10/02/2018    HGB 14.9 10/02/2018    HCT 46.1 10/02/2018    MCV 87 10/02/2018    MCH 28.1 10/02/2018    MCHC 32.3 10/02/2018    RDW 15.2 (H) 10/02/2018     10/02/2018       BMP RESULTS:  Lab Results   Component Value Date     01/22/2020    POTASSIUM 4.4 01/22/2020    " CHLORIDE 106 01/22/2020    CO2 30 01/22/2020    ANIONGAP 2 (L) 01/22/2020     (H) 01/22/2020    BUN 43 (H) 01/22/2020    CR 1.70 (H) 01/22/2020    GFRESTIMATED 40 (L) 01/22/2020    GFRESTBLACK 47 (L) 01/22/2020    YVONNE 9.2 01/22/2020        A1C RESULTS:  Lab Results   Component Value Date    A1C 7.7 (A) 08/09/2018       INR RESULTS:  Lab Results   Component Value Date    INR 0.98 09/10/2012           Thank you for allowing me to participate in the care of your patient.    Sincerely,     YVETTE Qureshi [facility]

## 2020-03-17 DIAGNOSIS — E78.6 HDL DEFICIENCY: Primary | ICD-10-CM

## 2020-03-17 RX ORDER — ATORVASTATIN CALCIUM 80 MG/1
80 TABLET, FILM COATED ORAL DAILY
Qty: 90 TABLET | Refills: 3 | Status: SHIPPED | OUTPATIENT
Start: 2020-03-17 | End: 2021-05-11

## 2020-04-22 DIAGNOSIS — I25.10 CORONARY ARTERY DISEASE INVOLVING NATIVE CORONARY ARTERY OF NATIVE HEART WITHOUT ANGINA PECTORIS: ICD-10-CM

## 2020-04-22 LAB
CHOLEST SERPL-MCNC: 65 MG/DL
HDLC SERPL-MCNC: 32 MG/DL
LDLC SERPL CALC-MCNC: 20 MG/DL
NONHDLC SERPL-MCNC: 33 MG/DL
TRIGL SERPL-MCNC: 66 MG/DL

## 2020-04-22 PROCEDURE — 36415 COLL VENOUS BLD VENIPUNCTURE: CPT | Performed by: NURSE PRACTITIONER

## 2020-04-22 PROCEDURE — 80061 LIPID PANEL: CPT | Performed by: NURSE PRACTITIONER

## 2020-04-23 DIAGNOSIS — E11.8 DIABETIC COMPLICATION (H): Primary | ICD-10-CM

## 2020-04-28 NOTE — PROGRESS NOTES
"Rhett Juares is a 70 year old male who is being evaluated via a billable telephone visit.      The patient has been notified of following:     \"This telephone visit will be conducted via a call between you and your physician/provider. We have found that certain health care needs can be provided without the need for a physical exam.  This service lets us provide the care you need with a short phone conversation.  If a prescription is necessary we can send it directly to your pharmacy.  If lab work is needed we can place an order for that and you can then stop by our lab to have the test done at a later time.    Telephone visits are billed at different rates depending on your insurance coverage. During this emergency period, for some insurers they may be billed the same as an in-person visit.  Please reach out to your insurance provider with any questions.    If during the course of the call the physician/provider feels a telephone visit is not appropriate, you will not be charged for this service.\"    Patient has given verbal consent for Telephone visit?  Yes    How would you like to obtain your AVS? Mail a copy     Review Of Systems  Skin: Negative  Eyes: Positive for glasses, floaters  Ears/Nose/Throat: Negative  Respiratory: Positive for sleep apnea - CPAP  Cardiovascular: Positive for occ lightheadedness, edema of the left ankle  Gastrointestinal: Negative  Genitourinary: Positive for frequency  Musculoskeletal: Negative  Neurologic: Positive for tingling/numbness in fingers  Psychiatric: Negative  Hematologic/Lymphatic/Immunologic: Negative  Endocrine: Positive for diabetes    Patient reported vitals:  BP: 127/71  Heart rate: 54   Weight: 203 lbs    Natacha Buenrostro CMA    Rhett Juares is a 70 year old male who is following up to discuss lab results   This visit is being conducted as a virtual visit due to the emphasis on mitigation of the COVID-19 virus pandemic. The clinician has decided that " the risk of an in-office visit outweighs the benefit for this patient.   He is a patient of Dr De La Garza  His medical history includes     1. Coronary artery disease.  S/p NIECY to  to second posterolateral branch (2012)  2. Hypertension   3. juvenile onset diabetes  4. Metabolic syndrome.  HDL deficiency and hypertriglyceridemia   5. Chronic renal disease.  Stage III    In 2012, patient had a positive stress echo resulting in a drug-eluting stent in the second posterolateral branch which resolved his symptoms.     In January 2020, patient saw Dr. De La Garza and was overall doing well.   He recommended decreasing patient's LDL to less than 50 and increased his atorvastatin from 40 to 80 mg daily with the recommendation that if it did not improve his rate lipid function Zetia should be added.  Weight loss was also discussed.        Today he reports intermittent dizziness with position changes despite taking at least 2L of fluid and having blood pressures between 120-140 mmHg.     He is exercising regularly and denies chest pain or pressure, syncope, angina, dyspnea at rest or with exertion, palpitations, orthopnea, PND, or edema.  Reports taking medications as prescribed    ASSESSMENT AND PLAN    Coronary artery disease    S/p NIECY to second posterolateral branch (2012)    He is currently asymptomatic and is exercising regularly - He is currently taking an aspirin 81 mg daily, metoprolol XL 50 mg daily and lisinopril 20 mg daily     He is currently taking atorvastatin 80 mg daily, zetia 10 mg daily, and fish oil.  LDL has dropped to 20     Encouraged patient to continue walking     Hypertension     Controlled with amlodipine, hydrochlorothiazide, lisinopril and metoprolol.    He is having some dizziness with position changes.  He will try to take his lisinopril or amlodipine in the evening vs all his antihypertensive medications in the morning     Plan    Follow up with [facility] in 1/2021    Follow up with DR. De La Garza in  1/2022    Encouraged patient to call if he has concerns or problems    Phone call duration: 11 minutes    YVETTE Qureshi CNP

## 2020-04-29 ENCOUNTER — VIRTUAL VISIT (OUTPATIENT)
Dept: CARDIOLOGY | Facility: CLINIC | Age: 70
End: 2020-04-29
Attending: NURSE PRACTITIONER
Payer: COMMERCIAL

## 2020-04-29 VITALS
WEIGHT: 203 LBS | DIASTOLIC BLOOD PRESSURE: 71 MMHG | HEIGHT: 68 IN | HEART RATE: 54 BPM | BODY MASS INDEX: 30.77 KG/M2 | SYSTOLIC BLOOD PRESSURE: 127 MMHG

## 2020-04-29 DIAGNOSIS — I25.10 CORONARY ARTERY DISEASE INVOLVING NATIVE CORONARY ARTERY OF NATIVE HEART WITHOUT ANGINA PECTORIS: ICD-10-CM

## 2020-04-29 DIAGNOSIS — E78.6 HDL DEFICIENCY: ICD-10-CM

## 2020-04-29 PROCEDURE — 99213 OFFICE O/P EST LOW 20 MIN: CPT | Mod: 95 | Performed by: NURSE PRACTITIONER

## 2020-04-29 RX ORDER — EZETIMIBE 10 MG/1
10 TABLET ORAL DAILY
Qty: 90 TABLET | Refills: 3 | Status: SHIPPED | OUTPATIENT
Start: 2020-04-29 | End: 2020-11-23

## 2020-04-29 ASSESSMENT — MIFFLIN-ST. JEOR: SCORE: 1660.3

## 2020-04-29 NOTE — LETTER
4/29/2020      RE: Rhett Juares  [address] [address] [address]       Dear Colleague,    Thank you for the opportunity to participate in the care of your patient, Rhett Juares, at the [facility]. Please see a copy of my visit note below.    Rhett Juares is a 70 year old male who is following up to discuss lab results   This visit is being conducted as a virtual visit due to the emphasis on mitigation of the COVID-19 virus pandemic. The clinician has decided that the risk of an in-office visit outweighs the benefit for this patient.   He is a patient of Dr De La Garza  His medical history includes     1. Coronary artery disease.  S/p NIECY to  to second posterolateral branch (2012)  2. Hypertension   3. juvenile onset diabetes  4. Metabolic syndrome.  HDL deficiency and hypertriglyceridemia   5. Chronic renal disease.  Stage III    In 2012, patient had a positive stress echo resulting in a drug-eluting stent in the second posterolateral branch which resolved his symptoms.     In January 2020, patient saw Dr. De La Garza and was overall doing well.   He recommended decreasing patient's LDL to less than 50 and increased his atorvastatin from 40 to 80 mg daily with the recommendation that if it did not improve his rate lipid function Zetia should be added.  Weight loss was also discussed.        Today he reports intermittent dizziness with position changes despite taking at least 2L of fluid and having blood pressures between 120-140 mmHg.     He is exercising regularly and denies chest pain or pressure, syncope, angina, dyspnea at rest or with exertion, palpitations, orthopnea, PND, or edema.  Reports taking medications as prescribed    ASSESSMENT AND PLAN    Coronary artery disease    S/p NIECY to second posterolateral branch (2012)    He is currently asymptomatic and is exercising  regularly - He is currently taking an aspirin 81 mg daily, metoprolol XL 50 mg daily and lisinopril 20 mg daily     He is currently taking atorvastatin 80 mg daily, zetia 10 mg daily, and fish oil.  LDL has dropped to 20     Encouraged patient to continue walking     Hypertension     Controlled with amlodipine, hydrochlorothiazide, lisinopril and metoprolol.    He is having some dizziness with position changes.  He will try to take his lisinopril or amlodipine in the evening vs all his antihypertensive medications in the morning     Plan    Follow up with [facility] in 1/2021    Follow up with DR. De La Garza in 1/2022    Encouraged patient to call if he has concerns or problems      Please do not hesitate to contact me if you have any questions/concerns.     Sincerely,     YVETTE Qureshi CNP

## 2020-09-08 LAB
ANION GAP SERPL CALCULATED.3IONS-SCNC: ABNORMAL MMOL/L
BUN SERPL-MCNC: 50 MG/DL
CALCIUM SERPL-MCNC: 8.7 MG/DL
CHLORIDE SERPLBLD-SCNC: 100 MMOL/L
CO2 SERPL-SCNC: 22 MMOL/L
CREAT SERPL-MCNC: 1.53 MG/DL
ERYTHROCYTE [DISTWIDTH] IN BLOOD BY AUTOMATED COUNT: 15.6 %
GFR SERPL CREATININE-BSD FRML MDRD: 46 ML/MIN/1.73M2
GLUCOSE SERPL-MCNC: 293 MG/DL (ref 70–100)
HCT VFR BLD AUTO: 28.9 %
HEMOGLOBIN: 9.2 G/DL (ref 13.1–17.5)
MCH RBC QN AUTO: 27.9 PG
MCHC RBC AUTO-ENTMCNC: 31.8 G/DL
MCV RBC AUTO: 87.6 FL
PLATELET # BLD AUTO: 555 10^9/L
POTASSIUM SERPL-SCNC: 5 MMOL/L
RBC # BLD AUTO: 3.3 10^12/L
SODIUM SERPL-SCNC: 132 MMOL/L
WBC # BLD AUTO: 10.42 10^9/L

## 2020-10-22 ENCOUNTER — PRE VISIT (OUTPATIENT)
Dept: CARDIOLOGY | Facility: CLINIC | Age: 70
End: 2020-10-22

## 2020-10-22 NOTE — LETTER
October 22, 2020       TO: Rhett Juares  [address] [facility] [address]       Dear Rhett Juares,    We are mailing you a form for the release of [place] [facility] De La Garza will want to look at their EKG strips and the echo they did during your admission. Please sign the form and mail it back to us as soon as you can.      Thank you,    Team 2 RNs  [phone]  [facility]

## 2020-10-22 NOTE — TELEPHONE ENCOUNTER
Received records from [facility], labs entered. Records are in Care Everywhere    Mailed an GRETA form to patient for [facility] [place], requested he mail it back to us asap. Records requests to [facility] have to include an GRETA.    11/2/2020 received GRETA from patient, request for [facility] [place] strips sent today.

## 2020-10-26 LAB
ALBUMIN SERPL-MCNC: 2.7 G/DL
ALP SERPL-CCNC: 82 U/L
ALT SERPL-CCNC: 18 U/L
AST SERPL-CCNC: 12 U/L
BILIRUB SERPL-MCNC: 0.2 MG/DL
BILIRUBIN DIRECT: 0.1 MG/DL
PROT SERPL-MCNC: 5.1 G/DL
TSH SERPL-ACNC: 3.19 MCU/ML

## 2020-11-02 LAB
ANION GAP SERPL CALCULATED.3IONS-SCNC: 8 MMOL/L
BUN SERPL-MCNC: 31 MG/DL
CALCIUM SERPL-MCNC: 9.4 MG/DL
CHLORIDE SERPLBLD-SCNC: 100 MMOL/L
CO2 SERPL-SCNC: 29 MMOL/L
CREAT SERPL-MCNC: 1.46 MG/DL
ERYTHROCYTE [DISTWIDTH] IN BLOOD BY AUTOMATED COUNT: 16.1 %
GFR SERPL CREATININE-BSD FRML MDRD: 48 ML/MIN/1.73M2
GLUCOSE SERPL-MCNC: 362 MG/DL (ref 70–99)
HCT VFR BLD AUTO: 35.2 %
HEMOGLOBIN: 10.6 G/DL (ref 13.3–17.7)
MCH RBC QN AUTO: 26.4 PG
MCHC RBC AUTO-ENTMCNC: 30.1 G/DL
MCV RBC AUTO: 88 FL
PLATELET # BLD AUTO: 392 10^9/L
POTASSIUM SERPL-SCNC: 5 MMOL/L
RBC # BLD AUTO: 4.01 10^12/L
SODIUM SERPL-SCNC: 137 MMOL/L
WBC # BLD AUTO: 9.8 10^9/L

## 2020-11-10 ENCOUNTER — PRE VISIT (OUTPATIENT)
Dept: CARDIOLOGY | Facility: CLINIC | Age: 70
End: 2020-11-10

## 2020-11-23 ENCOUNTER — OFFICE VISIT (OUTPATIENT)
Dept: CARDIOLOGY | Facility: CLINIC | Age: 70
End: 2020-11-23
Payer: COMMERCIAL

## 2020-11-23 ENCOUNTER — TELEPHONE (OUTPATIENT)
Dept: CARDIOLOGY | Facility: CLINIC | Age: 70
End: 2020-11-23

## 2020-11-23 VITALS
WEIGHT: 191 LBS | BODY MASS INDEX: 28.29 KG/M2 | OXYGEN SATURATION: 98 % | SYSTOLIC BLOOD PRESSURE: 165 MMHG | HEIGHT: 69 IN | DIASTOLIC BLOOD PRESSURE: 69 MMHG | HEART RATE: 79 BPM

## 2020-11-23 DIAGNOSIS — E78.6 HDL DEFICIENCY: ICD-10-CM

## 2020-11-23 DIAGNOSIS — E78.2 MIXED HYPERLIPIDEMIA: Chronic | ICD-10-CM

## 2020-11-23 DIAGNOSIS — E78.2 MIXED HYPERLIPIDEMIA: Primary | Chronic | ICD-10-CM

## 2020-11-23 DIAGNOSIS — I10 ESSENTIAL HYPERTENSION: ICD-10-CM

## 2020-11-23 DIAGNOSIS — I25.10 CORONARY ARTERY DISEASE INVOLVING NATIVE CORONARY ARTERY OF NATIVE HEART WITHOUT ANGINA PECTORIS: Primary | ICD-10-CM

## 2020-11-23 LAB
CHOLEST SERPL-MCNC: 79 MG/DL
HDLC SERPL-MCNC: 37 MG/DL
LDLC SERPL CALC-MCNC: 27 MG/DL
NONHDLC SERPL-MCNC: 42 MG/DL
TRIGL SERPL-MCNC: 73 MG/DL

## 2020-11-23 PROCEDURE — 36415 COLL VENOUS BLD VENIPUNCTURE: CPT | Performed by: INTERNAL MEDICINE

## 2020-11-23 PROCEDURE — 93000 ELECTROCARDIOGRAM COMPLETE: CPT | Performed by: INTERNAL MEDICINE

## 2020-11-23 PROCEDURE — 80061 LIPID PANEL: CPT | Performed by: INTERNAL MEDICINE

## 2020-11-23 PROCEDURE — 99214 OFFICE O/P EST MOD 30 MIN: CPT | Performed by: INTERNAL MEDICINE

## 2020-11-23 RX ORDER — DOCUSATE SODIUM 50 MG/5ML
LIQUID ORAL
COMMUNITY
Start: 2020-11-11

## 2020-11-23 RX ORDER — GUAR GUM
1 PACKET (EA) ORAL
COMMUNITY
Start: 2020-10-19

## 2020-11-23 RX ORDER — MAGNESIUM CITRATE
SOLUTION, ORAL ORAL
COMMUNITY
Start: 2020-11-11

## 2020-11-23 RX ORDER — GABAPENTIN 100 MG/1
100 CAPSULE ORAL
COMMUNITY
Start: 2020-11-17

## 2020-11-23 RX ORDER — CHOLECALCIFEROL (VITAMIN D3) 125 MCG
CAPSULE ORAL
COMMUNITY
Start: 2020-09-02

## 2020-11-23 RX ORDER — POLYETHYLENE GLYCOL 400 AND PROPYLENE GLYCOL 4; 3 MG/ML; MG/ML
SOLUTION/ DROPS OPHTHALMIC
COMMUNITY
Start: 2020-11-17

## 2020-11-23 RX ORDER — LIDOCAINE 4 G/G
PATCH TOPICAL
COMMUNITY
Start: 2020-11-17

## 2020-11-23 RX ORDER — DOCUSATE SODIUM 100 MG/1
100 CAPSULE, LIQUID FILLED ORAL
COMMUNITY
Start: 2020-10-19

## 2020-11-23 RX ORDER — INSULIN GLARGINE 100 [IU]/ML
30 INJECTION, SOLUTION SUBCUTANEOUS
COMMUNITY
Start: 2020-09-02

## 2020-11-23 RX ORDER — CYCLOBENZAPRINE HCL 10 MG
10 TABLET ORAL
COMMUNITY
Start: 2020-09-02

## 2020-11-23 ASSESSMENT — MIFFLIN-ST. JEOR: SCORE: 1613.81

## 2020-11-23 NOTE — TELEPHONE ENCOUNTER
Thanks I discontinued his Zetia today as his LDL is too low.  I have asked that he have a recheck fasting lipid profile in 1 month.  As he has quadriparesis from his fall may be they can check it at [place] and send the numbers to us

## 2020-11-23 NOTE — TELEPHONE ENCOUNTER
Lipid panel 11/23/2020 noted. Drawn after OV today with Dr. De La Garza.  zetia therapy stopped today    Component      Latest Ref Rng & Units 11/23/2020   Cholesterol      <200 mg/dL 79   Triglycerides      <150 mg/dL 73   HDL Cholesterol      >39 mg/dL 37 (L)   LDL Cholesterol Calculated      <100 mg/dL 27   Non HDL Cholesterol      <130 mg/dL 42     Will message Dr. De La Garza to review

## 2020-11-23 NOTE — LETTER
11/23/2020      Radha Joseph NP  [facility] [address] [place] [address]      RE: Rhett Alicia Mor       Dear Colleague,    I had the pleasure of seeing Rhett Juares in the [facility].    Service Date: 11/23/2020      HISTORY OF PRESENT ILLNESS:  Mr. 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  He also has chronic renal insufficiency, stage IIIA.        I saw Mr. Juares in clinic in 2012 for a positive stress echo which led to a 2.25 x 28 mm drug-eluting stent placed in his second posterolateral branch.  He also had disease in the terminal circumflex.  This resulted in complete resolution of his symptoms and he did well ever since.      Mr. 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  He is currently in the [facility] and scheduled to be discharged the end of December.  Unfortunately, he has not gained very much control of his arms and legs.  He is still working with PT and OT to overcome his weakness.      I reviewed the records that show that he had a normal echocardiogram.  He was having bigeminy when he was off his metoprolol.      Rhett returns to clinic stating he thinks he is doing well from a cardiac standpoint.  He has no chest, arm, neck, jaw or shoulder discomfort.  No dyspnea, orthopnea or PND.  He does have some peripheral edema.  He has no palpitations, lightheadedness, dizziness, syncope or near-syncope.  He notes no side effects with his current medical regimen.      ASSESSMENT AND  PLAN:  Tiffany has no symptoms to suggest ischemia, heart failure or significant arrhythmia.      Blood pressure is quite high today at 165/69, although review of the chart shows all previous blood pressures to be very well controlled and he states he was checked the [facility] and now [place] and states his blood pressure has not been an issue.      Weight is 191 pounds.  He is down 8 pounds from last February.  He states he is eating smaller portions.  Body mass index is 28.6.      Fasting lipid profile from April with the addition of Zetia to his regimen shows total cholesterol 65, HDL is 32, LDL is 20, triglycerides are 66.  I think this is too aggressively treated.  I will check it today to see how it has changed with his change in activity, but I will empirically stop his Zetia at this time.  I will send off a cholesterol profile.      Basic metabolic profile was checked in November.  Creatinine is 1.46, giving him a GFR of 48.  Electrolytes are within normal limits.      He does have a mild amount of peripheral edema that is likely dependent edema and contributed to by his 2.5 mg of amlodipine.  I will continue his current medical regimen as is.      It appears he is on enoxaparin for DVT prophylaxis.      We will follow up on his lipid profile.  Otherwise, I will have him follow up with my [facility] in 6 months.  I will see him back in a year.  If his blood pressure should stay high, we can help address this.         AMARILYS CAMPOS MD, [facility]             D: 2020   T: 2020   MT: AIDEE      Name:     TIFFANY LUCERO   MRN:      [id number]        Account:      [id number]   :      1950           Service Date: 2020      Document: [id number]          Outpatient Encounter Medications as of 2020   Medication Sig Dispense Refill     allopurinol (ZYLOPRIM) 100 MG tablet Take 100 mg by mouth daily       amLODIPine (NORVASC) 2.5 MG tablet Take 2.5 mg by mouth daily       aspirin  (ASPIRIN EC ADULT LOW STRENGTH) 81 MG EC tablet Aspirin 81mg on Monday, Wednesday, Friday 1 tablet 0     atorvastatin (LIPITOR) 80 MG tablet Take 1 tablet (80 mg) by mouth daily 90 tablet 3     CALCITRIOL PO Take 0.25 mcg by mouth three times a week.         Cholecalciferol (VITAMIN D3 PO) Take 2,000 Units by mouth daily        CONTOUR NEXT TEST test strip USE TO TEST 5 TIMES DAILY       cyclobenzaprine (FLEXERIL) 10 MG tablet Take 10 mg by mouth       docusate sodium (DSS) 100 MG capsule Take 100 mg by mouth       Docusate Sodium 150 MG/15ML LIQD Use 100ml for the pink lady enema       enoxaparin ANTICOAGULANT (LOVENOX) 40 MG/0.4ML syringe Inject 40 mg Subcutaneous       gabapentin (NEURONTIN) 100 MG capsule Take 100 mg by mouth       Guar Gum (FIBER MODULAR, NUTRISOURCE FIBER,) packet Take 1 packet by mouth       hydrochlorothiazide (HYDRODIURIL) 25 MG tablet Take 1 tablet by mouth daily. 90 tablet 2     insulin aspart (NOVOLOG PEN) 100 UNIT/ML pen Give 10u breakfast and lunch. Give 6u with dinner.  +TID SS less than 150, 0u; 151-199  2U; 200-249=4U; 250-299=6U; 300-349=8U; 350-399=10U; >399 12U       insulin glargine (LANTUS VIAL) 100 UNIT/ML vial Inject 30 Units Subcutaneous       lactase (LACTAID) 3000 UNIT tablet        Lidocaine (LIDOCARE) 4 % Patch Apply 1 patch to intact skin for up to 12 hrs within 24-hr period. Apply to the most painful area of lower back. Remove old patch after 12 hours.       lisinopril (PRINIVIL,ZESTRIL) 20 MG tablet Take 20 mg by mouth daily       Magnesium Citrate (CITRATE OF MAGNESIA) SOLN Use 150ml for a Pink Lady Enema       metoprolol (TOPROL-XL) 50 MG 24 hr tablet Take 50 mg by mouth daily       polyethylene glycol-propylene glycol (SYSTANE) 0.4-0.3 % SOLN ophthalmic solution Instill 2 drops in both eyes every hour as needed for dry eyes.       QUEtiapine (SEROQUEL) 100 MG tablet Take 100 mg by mouth daily       venlafaxine (EFFEXOR-XR) 150 MG 24 hr capsule Take 150 mg by mouth  daily       Omega-3 Fatty Acids (FISH OIL) 1000 MG CPDR Take 1 g by mouth        [DISCONTINUED] ezetimibe (ZETIA) 10 MG tablet Take 1 tablet (10 mg) by mouth daily 90 tablet 3     [DISCONTINUED] ondansetron (ZOFRAN ODT) 4 MG ODT tab Take 1 tablet (4 mg) by mouth every 8 hours as needed for nausea (Patient not taking: Reported on 1/15/2019) 10 tablet 0     No facility-administered encounter medications on file as of 11/23/2020.        Again, thank you for allowing me to participate in the care of your patient.      Sincerely,    Kd De La Garza MD     [facility]

## 2020-11-23 NOTE — LETTER
11/23/2020    Radha Joseph NP  [facility] [address] [place] [address]    RE: Rhett Juares       Dear Colleague,    I had the pleasure of seeing Rhett Juares in the [facility].    HPI and Plan:   See dictation    Orders Placed This Encounter   Procedures     Lipid Profile     Basic metabolic panel     Basic metabolic panel     Lipid Profile     Follow-Up with Cardiac Advanced Practice Provider     Follow-Up with Cardiologist     EKG 12-lead complete w/read - Clinics (performed today)       Orders Placed This Encounter   Medications     cyclobenzaprine (FLEXERIL) 10 MG tablet     Sig: Take 10 mg by mouth     docusate sodium (DSS) 100 MG capsule     Sig: Take 100 mg by mouth     Docusate Sodium 150 MG/15ML LIQD     Sig: Use 100ml for the pink lady enema     enoxaparin ANTICOAGULANT (LOVENOX) 40 MG/0.4ML syringe     Sig: Inject 40 mg Subcutaneous     gabapentin (NEURONTIN) 100 MG capsule     Sig: Take 100 mg by mouth     Guar Gum (FIBER MODULAR, NUTRISOURCE FIBER,) packet     Sig: Take 1 packet by mouth     CONTOUR NEXT TEST test strip     Sig: USE TO TEST 5 TIMES DAILY     insulin glargine (LANTUS VIAL) 100 UNIT/ML vial     Sig: Inject 30 Units Subcutaneous     insulin aspart (NOVOLOG PEN) 100 UNIT/ML pen     Sig: Give 10u breakfast and lunch. Give 6u with dinner.  +TID SS less than 150, 0u; 151-199  2U; 200-249=4U; 250-299=6U; 300-349=8U; 350-399=10U; >399 12U     lactase (LACTAID) 3000 UNIT tablet     Lidocaine (LIDOCARE) 4 % Patch     Sig: Apply 1 patch to intact skin for up to 12 hrs within 24-hr period. Apply to the most painful area of lower back. Remove old patch after 12 hours.     Magnesium Citrate (CITRATE OF MAGNESIA) SOLN     Sig: Use 150ml for a Pink Lady Enema     polyethylene glycol-propylene glycol (SYSTANE) 0.4-0.3 % SOLN ophthalmic solution     Sig: Instill 2 drops in both eyes every hour as needed for dry  eyes.       Medications Discontinued During This Encounter   Medication Reason     ezetimibe (ZETIA) 10 MG tablet      ondansetron (ZOFRAN ODT) 4 MG ODT tab          Encounter Diagnoses   Name Primary?     Coronary artery disease involving native coronary artery of native heart without angina pectoris Yes     Mixed hyperlipidemia      HDL deficiency      Essential hypertension        CURRENT MEDICATIONS:  Current Outpatient Medications   Medication Sig Dispense Refill     allopurinol (ZYLOPRIM) 100 MG tablet Take 100 mg by mouth daily       amLODIPine (NORVASC) 2.5 MG tablet Take 2.5 mg by mouth daily       aspirin (ASPIRIN EC ADULT LOW STRENGTH) 81 MG EC tablet Aspirin 81mg on Monday, Wednesday, Friday 1 tablet 0     atorvastatin (LIPITOR) 80 MG tablet Take 1 tablet (80 mg) by mouth daily 90 tablet 3     CALCITRIOL PO Take 0.25 mcg by mouth three times a week.         Cholecalciferol (VITAMIN D3 PO) Take 2,000 Units by mouth daily        CONTOUR NEXT TEST test strip USE TO TEST 5 TIMES DAILY       cyclobenzaprine (FLEXERIL) 10 MG tablet Take 10 mg by mouth       docusate sodium (DSS) 100 MG capsule Take 100 mg by mouth       Docusate Sodium 150 MG/15ML LIQD Use 100ml for the pink lady enema       enoxaparin ANTICOAGULANT (LOVENOX) 40 MG/0.4ML syringe Inject 40 mg Subcutaneous       gabapentin (NEURONTIN) 100 MG capsule Take 100 mg by mouth       Guar Gum (FIBER MODULAR, NUTRISOURCE FIBER,) packet Take 1 packet by mouth       hydrochlorothiazide (HYDRODIURIL) 25 MG tablet Take 1 tablet by mouth daily. 90 tablet 2     insulin aspart (NOVOLOG PEN) 100 UNIT/ML pen Give 10u breakfast and lunch. Give 6u with dinner.  +TID SS less than 150, 0u; 151-199  2U; 200-249=4U; 250-299=6U; 300-349=8U; 350-399=10U; >399 12U       insulin glargine (LANTUS VIAL) 100 UNIT/ML vial Inject 30 Units Subcutaneous       lactase (LACTAID) 3000 UNIT tablet        Lidocaine (LIDOCARE) 4 % Patch Apply 1 patch to intact skin for up to 12 hrs  within 24-hr period. Apply to the most painful area of lower back. Remove old patch after 12 hours.       lisinopril (PRINIVIL,ZESTRIL) 20 MG tablet Take 20 mg by mouth daily       Magnesium Citrate (CITRATE OF MAGNESIA) SOLN Use 150ml for a Pink Lady Enema       metoprolol (TOPROL-XL) 50 MG 24 hr tablet Take 50 mg by mouth daily       polyethylene glycol-propylene glycol (SYSTANE) 0.4-0.3 % SOLN ophthalmic solution Instill 2 drops in both eyes every hour as needed for dry eyes.       QUEtiapine (SEROQUEL) 100 MG tablet Take 100 mg by mouth daily       venlafaxine (EFFEXOR-XR) 150 MG 24 hr capsule Take 150 mg by mouth daily       Omega-3 Fatty Acids (FISH OIL) 1000 MG CPDR Take 1 g by mouth          ALLERGIES     Allergies   Allergen Reactions     Carbidopa-Levodopa Nausea and Vomiting     Penicillin G        PAST MEDICAL HISTORY:  Past Medical History:   Diagnosis Date     Central stenosis of spinal canal     s/p C4-6 laminectomy, C4-6 arthrodesis, and C4-6 instrumented fusion on 8/27/20     Chronic kidney disease 07/06/2015    managed by Dr. Culver     Coronary artery disease involving native coronary artery of native heart without angina pectoris 09/2012 2012 - NIECY to posterolateral branch      Diabetes mellitus (H)     insulin pump, managed by Dr. Bennett yanes     coffee-ground emesis Oct 2018     Gout      History of spinal cord injury     traumatic due to fall down steps 10/2020; residual neurogenic bowel & bladder, rib fractures and herniated discs     Hypertension      Lipoprotein deficiencies      Parkinsonism (H) 12/04/2014     Pure hypercholesterolemia      S/P coronary artery stent placement 12/03/2014     Tremor 12/03/2014       PAST SURGICAL HISTORY:  Past Surgical History:   Procedure Laterality Date     HEART CATH, ANGIOPLASTY  09/14/2012    second posterolateral branch off the dominant right coronary artery,     OTHER SURGICAL HISTORY      C4-6 laminectomy, C4-6 arthrodesis, and C4-6  instrumented fusion on 8/27/20       FAMILY HISTORY:  Family History   Problem Relation Age of Onset     Heart Disease Mother      Neurologic Disorder Father         Tremors at age of 70/Seizures       SOCIAL HISTORY:  Social History     Socioeconomic History     Marital status:      Spouse name: None     Number of children: None     Years of education: None     Highest education level: None   Occupational History     None   Social Needs     Financial resource strain: None     Food insecurity     Worry: None     Inability: None     Transportation needs     Medical: None     Non-medical: None   Tobacco Use     Smoking status: Never Smoker     Smokeless tobacco: Never Used   Substance and Sexual Activity     Alcohol use: Yes     Alcohol/week: 0.0 standard drinks     Comment: occasional beer     Drug use: No     Sexual activity: None     Comment: not assessed   Lifestyle     Physical activity     Days per week: None     Minutes per session: None     Stress: None   Relationships     Social connections     Talks on phone: None     Gets together: None     Attends Jain service: None     Active member of club or organization: None     Attends meetings of clubs or organizations: None     Relationship status: None     Intimate partner violence     Fear of current or ex partner: None     Emotionally abused: None     Physically abused: None     Forced sexual activity: None   Other Topics Concern     Parent/sibling w/ CABG, MI or angioplasty before 65F 55M? 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            FH: family history includes Heart in his mother; Neurological in his father and father.       Review of Systems:  Skin:  Negative for       Eyes:  Positive for glasses    ENT:  Positive for      Respiratory:  Positive for sleep apnea;CPAP     Cardiovascular:    edema;Positive for    Gastroenterology: Positive for constipation    Genitourinary:  not assessed      Musculoskeletal:  Negative for      Neurologic:  Positive for      Psychiatric:  Negative for      Heme/Lymph/Imm:  Negative      Endocrine:  Positive for diabetes      Physical Exam:  Vitals: BP (!) 165/69 (BP Location: Left arm, Patient Position: Sitting, Cuff Size: Adult Regular)   Pulse 79   Ht 1.74 m (5' 8.5\")   Wt 86.6 kg (191 lb)   SpO2 98%   BMI 28.62 kg/m      Constitutional:  cooperative, alert and oriented, well developed, well nourished, in no acute distress overweight Wheelchair-bound due to quadriparesis    Skin:  warm and dry to the touch, no apparent skin lesions or masses noted          Head:  normocephalic, no masses or lesions        Eyes:  pupils equal and round, conjunctivae and lids unremarkable, sclera white, no xanthalasma, EOMS intact, no nystagmus        Lymph:      ENT:  no pallor or cyanosis, dentition good        Neck:  no carotid bruit;carotid pulses are full and equal bilaterally        Respiratory:  normal breath sounds, clear to auscultation, normal A-P diameter, normal symmetry, normal respiratory excursion, no use of accessory muscles         Cardiac: regular rhythm;normal S1 and S2     no presence of murmur          pulses full and equal                                        GI:    obese      Extremities and Muscular Skeletal:  " normal muscle strength and tone;no spinal abnormalities noted       LLE edema;trace;1+      Neurological:      Quadraparesis    Psych:  affect appropriate, oriented to time, person and place        CC  No referring provider defined for this encounter.                  Thank you for allowing me to participate in the care of your patient.      Sincerely,     Kd De La Garza MD     [facility]    cc:   No referring provider defined for this encounter.

## 2020-11-23 NOTE — PROGRESS NOTES
HPI and Plan:   See dictation    Orders Placed This Encounter   Procedures     Lipid Profile     Basic metabolic panel     Basic metabolic panel     Lipid Profile     Follow-Up with Cardiac Advanced Practice Provider     Follow-Up with Cardiologist     EKG 12-lead complete w/read - Clinics (performed today)       Orders Placed This Encounter   Medications     cyclobenzaprine (FLEXERIL) 10 MG tablet     Sig: Take 10 mg by mouth     docusate sodium (DSS) 100 MG capsule     Sig: Take 100 mg by mouth     Docusate Sodium 150 MG/15ML LIQD     Sig: Use 100ml for the pink lady enema     enoxaparin ANTICOAGULANT (LOVENOX) 40 MG/0.4ML syringe     Sig: Inject 40 mg Subcutaneous     gabapentin (NEURONTIN) 100 MG capsule     Sig: Take 100 mg by mouth     Guar Gum (FIBER MODULAR, NUTRISOURCE FIBER,) packet     Sig: Take 1 packet by mouth     CONTOUR NEXT TEST test strip     Sig: USE TO TEST 5 TIMES DAILY     insulin glargine (LANTUS VIAL) 100 UNIT/ML vial     Sig: Inject 30 Units Subcutaneous     insulin aspart (NOVOLOG PEN) 100 UNIT/ML pen     Sig: Give 10u breakfast and lunch. Give 6u with dinner.  +TID SS less than 150, 0u; 151-199  2U; 200-249=4U; 250-299=6U; 300-349=8U; 350-399=10U; >399 12U     lactase (LACTAID) 3000 UNIT tablet     Lidocaine (LIDOCARE) 4 % Patch     Sig: Apply 1 patch to intact skin for up to 12 hrs within 24-hr period. Apply to the most painful area of lower back. Remove old patch after 12 hours.     Magnesium Citrate (CITRATE OF MAGNESIA) SOLN     Sig: Use 150ml for a Pink Lady Enema     polyethylene glycol-propylene glycol (SYSTANE) 0.4-0.3 % SOLN ophthalmic solution     Sig: Instill 2 drops in both eyes every hour as needed for dry eyes.       Medications Discontinued During This Encounter   Medication Reason     ezetimibe (ZETIA) 10 MG tablet      ondansetron (ZOFRAN ODT) 4 MG ODT tab          Encounter Diagnoses   Name Primary?     Coronary artery disease involving native coronary artery of native  heart without angina pectoris Yes     Mixed hyperlipidemia      HDL deficiency      Essential hypertension        CURRENT MEDICATIONS:  Current Outpatient Medications   Medication Sig Dispense Refill     allopurinol (ZYLOPRIM) 100 MG tablet Take 100 mg by mouth daily       amLODIPine (NORVASC) 2.5 MG tablet Take 2.5 mg by mouth daily       aspirin (ASPIRIN EC ADULT LOW STRENGTH) 81 MG EC tablet Aspirin 81mg on Monday, Wednesday, Friday 1 tablet 0     atorvastatin (LIPITOR) 80 MG tablet Take 1 tablet (80 mg) by mouth daily 90 tablet 3     CALCITRIOL PO Take 0.25 mcg by mouth three times a week.         Cholecalciferol (VITAMIN D3 PO) Take 2,000 Units by mouth daily        CONTOUR NEXT TEST test strip USE TO TEST 5 TIMES DAILY       cyclobenzaprine (FLEXERIL) 10 MG tablet Take 10 mg by mouth       docusate sodium (DSS) 100 MG capsule Take 100 mg by mouth       Docusate Sodium 150 MG/15ML LIQD Use 100ml for the pink lady enema       enoxaparin ANTICOAGULANT (LOVENOX) 40 MG/0.4ML syringe Inject 40 mg Subcutaneous       gabapentin (NEURONTIN) 100 MG capsule Take 100 mg by mouth       Guar Gum (FIBER MODULAR, NUTRISOURCE FIBER,) packet Take 1 packet by mouth       hydrochlorothiazide (HYDRODIURIL) 25 MG tablet Take 1 tablet by mouth daily. 90 tablet 2     insulin aspart (NOVOLOG PEN) 100 UNIT/ML pen Give 10u breakfast and lunch. Give 6u with dinner.  +TID SS less than 150, 0u; 151-199  2U; 200-249=4U; 250-299=6U; 300-349=8U; 350-399=10U; >399 12U       insulin glargine (LANTUS VIAL) 100 UNIT/ML vial Inject 30 Units Subcutaneous       lactase (LACTAID) 3000 UNIT tablet        Lidocaine (LIDOCARE) 4 % Patch Apply 1 patch to intact skin for up to 12 hrs within 24-hr period. Apply to the most painful area of lower back. Remove old patch after 12 hours.       lisinopril (PRINIVIL,ZESTRIL) 20 MG tablet Take 20 mg by mouth daily       Magnesium Citrate (CITRATE OF MAGNESIA) SOLN Use 150ml for a Pink Lady Enema       metoprolol  (TOPROL-XL) 50 MG 24 hr tablet Take 50 mg by mouth daily       polyethylene glycol-propylene glycol (SYSTANE) 0.4-0.3 % SOLN ophthalmic solution Instill 2 drops in both eyes every hour as needed for dry eyes.       QUEtiapine (SEROQUEL) 100 MG tablet Take 100 mg by mouth daily       venlafaxine (EFFEXOR-XR) 150 MG 24 hr capsule Take 150 mg by mouth daily       Omega-3 Fatty Acids (FISH OIL) 1000 MG CPDR Take 1 g by mouth          ALLERGIES     Allergies   Allergen Reactions     Carbidopa-Levodopa Nausea and Vomiting     Penicillin G        PAST MEDICAL HISTORY:  Past Medical History:   Diagnosis Date     Central stenosis of spinal canal     s/p C4-6 laminectomy, C4-6 arthrodesis, and C4-6 instrumented fusion on 8/27/20     Chronic kidney disease 07/06/2015    managed by Dr. Culver     Coronary artery disease involving native coronary artery of native heart without angina pectoris 09/2012 2012 - NIECY to posterolateral branch      Diabetes mellitus (H)     insulin pump, managed by Dr. Guajardo     GI bleed     coffee-ground emesis Oct 2018     Gout      History of spinal cord injury     traumatic due to fall down steps 10/2020; residual neurogenic bowel & bladder, rib fractures and herniated discs     Hypertension      Lipoprotein deficiencies      Parkinsonism (H) 12/04/2014     Pure hypercholesterolemia      S/P coronary artery stent placement 12/03/2014     Tremor 12/03/2014       PAST SURGICAL HISTORY:  Past Surgical History:   Procedure Laterality Date     HEART CATH, ANGIOPLASTY  09/14/2012    second posterolateral branch off the dominant right coronary artery,     OTHER SURGICAL HISTORY      C4-6 laminectomy, C4-6 arthrodesis, and C4-6 instrumented fusion on 8/27/20       FAMILY HISTORY:  Family History   Problem Relation Age of Onset     Heart Disease Mother      Neurologic Disorder Father         Tremors at age of 70/Seizures       SOCIAL HISTORY:  Social History     Socioeconomic History     Marital status:       Spouse name: None     Number of children: None     Years of education: None     Highest education level: None   Occupational History     None   Social Needs     Financial resource strain: None     Food insecurity     Worry: None     Inability: None     Transportation needs     Medical: None     Non-medical: None   Tobacco Use     Smoking status: Never Smoker     Smokeless tobacco: Never Used   Substance and Sexual Activity     Alcohol use: Yes     Alcohol/week: 0.0 standard drinks     Comment: occasional beer     Drug use: No     Sexual activity: None     Comment: not assessed   Lifestyle     Physical activity     Days per week: None     Minutes per session: None     Stress: None   Relationships     Social connections     Talks on phone: None     Gets together: None     Attends Caodaism service: None     Active member of club or organization: None     Attends meetings of clubs or organizations: None     Relationship status: None     Intimate partner violence     Fear of current or ex partner: None     Emotionally abused: None     Physically abused: None     Forced sexual activity: None   Other Topics Concern     Parent/sibling w/ CABG, MI or angioplasty before 65F 55M? 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  "           FH: family history includes Heart in his mother; Neurological in his father and father.       Review of Systems:  Skin:  Negative for       Eyes:  Positive for glasses    ENT:  Positive for      Respiratory:  Positive for sleep apnea;CPAP     Cardiovascular:    edema;Positive for    Gastroenterology: Positive for constipation    Genitourinary:  not assessed      Musculoskeletal:  Negative for      Neurologic:  Positive for      Psychiatric:  Negative for      Heme/Lymph/Imm:  Negative      Endocrine:  Positive for diabetes      Physical Exam:  Vitals: BP (!) 165/69 (BP Location: Left arm, Patient Position: Sitting, Cuff Size: Adult Regular)   Pulse 79   Ht 1.74 m (5' 8.5\")   Wt 86.6 kg (191 lb)   SpO2 98%   BMI 28.62 kg/m      Constitutional:  cooperative, alert and oriented, well developed, well nourished, in no acute distress overweight Wheelchair-bound due to quadriparesis    Skin:  warm and dry to the touch, no apparent skin lesions or masses noted          Head:  normocephalic, no masses or lesions        Eyes:  pupils equal and round, conjunctivae and lids unremarkable, sclera white, no xanthalasma, EOMS intact, no nystagmus        Lymph:      ENT:  no pallor or cyanosis, dentition good        Neck:  no carotid bruit;carotid pulses are full and equal bilaterally        Respiratory:  normal breath sounds, clear to auscultation, normal A-P diameter, normal symmetry, normal respiratory excursion, no use of accessory muscles         Cardiac: regular rhythm;normal S1 and S2     no presence of murmur          pulses full and equal                                        GI:    obese      Extremities and Muscular Skeletal:  normal muscle strength and tone;no spinal abnormalities noted       LLE edema;trace;1+      Neurological:      Quadraparesis    Psych:  affect appropriate, oriented to time, person and place        CC  No referring provider defined for this encounter.              "

## 2020-11-24 NOTE — TELEPHONE ENCOUNTER
Order placed for flp/alt in 1 month. Will follow up once we know where he is living at the time the labs are due.

## 2020-12-22 ENCOUNTER — TELEPHONE (OUTPATIENT)
Dept: CARDIOLOGY | Facility: CLINIC | Age: 70
End: 2020-12-22

## 2020-12-22 NOTE — LETTER
December 23, 2020       TO: Rhett Juares   [address] [address]       Dear Mr. Juares,    The results of your recent Laboratory tests.    Results for orders placed or performed in visit on 12/22/20   Lipid Profile     Status: None   Result Value Ref Range    Cholesterol 77 100 - 199 mg/dL    Triglycerides 91 <150 mg/dL    HDL Cholesterol 21 >40 mg/dL    LDL Cholesterol Calculated 38 <=130 mg/dL    Non HDL Cholesterol           Per Dr. De La Garza: Continue current meds      Sincerely,    Team 2 RNs  [phone]  [facility]

## 2020-12-22 NOTE — TELEPHONE ENCOUNTER
Pt due for FLP/ALT after stopping zetia at end of November. Called patient to verify if he was still at rehab or if he has discharged to home, number on file for patient no longer in service. Called patient's wife, left message to call back. Will need to verify where to fax orders or if patient is to be set up at a clinic lab.

## 2020-12-23 LAB
CHOLEST SERPL-MCNC: 77 MG/DL
HDLC SERPL-MCNC: 21 MG/DL
LDLC SERPL CALC-MCNC: 38 MG/DL
NONHDLC SERPL-MCNC: NORMAL MG/DL
TRIGL SERPL-MCNC: 91 MG/DL

## 2020-12-23 NOTE — TELEPHONE ENCOUNTER
Care Everywhere lab work results show lipid panel drawn 12/23/2020. Patient discontinued zetia in November per Dr. De La Garza.  Previous LDL=27          Will message Dr. De La Garza to review

## 2020-12-23 NOTE — TELEPHONE ENCOUNTER
Left message for spouse with Dr. De La Garza's review of the lipid panel. Results letter mailed to patient.

## 2021-05-11 DIAGNOSIS — E78.6 HDL DEFICIENCY: ICD-10-CM

## 2021-05-11 RX ORDER — ATORVASTATIN CALCIUM 80 MG/1
80 TABLET, FILM COATED ORAL DAILY
Qty: 90 TABLET | Refills: 2 | Status: SHIPPED | OUTPATIENT
Start: 2021-05-11

## 2021-05-11 NOTE — TELEPHONE ENCOUNTER
Received refill request for:  Atorvastatin  Last OV was: 11/23/2020 with Dr. De La Garza  Labs/EKG: last Lipid 12/23/2020  F/U scheduled: orders in Epic for 5/2021, not yet scheduled.  New script sent to: [facility]

## 2021-05-11 NOTE — LETTER
Rhett Juares  [address] [place] [address]         Dear Rhett Juares,      Our records indicate that you are due for follow-up with your Heart Care  Provider.      As part of our effort to provide you with the best care possible, we have  expanded our services to include video visits in addition to in-person clinic visits.    Please call our office at [phone] to schedule your appointment.    Thank you for allowing [facility] to be a part of your health  care team.     Sincerely,      [facility]

## 2021-07-13 ENCOUNTER — TELEPHONE (OUTPATIENT)
Dept: CARDIOLOGY | Facility: CLINIC | Age: 71
End: 2021-07-13

## 2021-07-13 NOTE — TELEPHONE ENCOUNTER
[place] [facility] MEASURES BLOOD PRESSURE RECHECK      Last office visit: 11/23/20    Previous blood pressure: 165 mm Hg  Previous heart rate: 69 bpm    Time of visit:     Morning medications were taken at:      Today's blood pressure:  mm Hg  Today's heart rate:  bpm     Nurse reading at nursing home per [place] alvarado: 130/70      Additional Comments:       Results routed to:

## 2025-01-25 NOTE — PROGRESS NOTES
Service Date: 11/23/2020      HISTORY OF PRESENT ILLNESS:  Mr. 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  He also has chronic renal insufficiency, stage IIIA.        I saw Mr. Juares in clinic in 2012 for a positive stress echo which led to a 2.25 x 28 mm drug-eluting stent placed in his second posterolateral branch.  He also had disease in the terminal circumflex.  This resulted in complete resolution of his symptoms and he did well ever since.      Mr. 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  He is currently in the [facility] and scheduled to be discharged the end of December.  Unfortunately, he has not gained very much control of his arms and legs.  He is still working with PT and OT to overcome his weakness.      I reviewed the records that show that he had a normal echocardiogram.  He was having bigeminy when he was off his metoprolol.      Rhett returns to clinic stating he thinks he is doing well from a cardiac standpoint.  He has no chest, arm, neck, jaw or shoulder discomfort.  No dyspnea, orthopnea or PND.  He does have some peripheral edema.  He has no palpitations, lightheadedness, dizziness, syncope or near-syncope.  He notes no side effects with his current medical regimen.      ASSESSMENT AND PLAN:  Rhett has no symptoms to suggest ischemia, heart failure or significant arrhythmia.      Blood pressure is quite high today at 165/69, although review of the chart shows all previous blood pressures to be very well controlled and he states he was checked the [facility]  and now [place] and states his blood pressure has not been an issue.      Weight is 191 pounds.  He is down 8 pounds from last February.  He states he is eating smaller portions.  Body mass index is 28.6.      Fasting lipid profile from April with the addition of Zetia to his regimen shows total cholesterol 65, HDL is 32, LDL is 20, triglycerides are 66.  I think this is too aggressively treated.  I will check it today to see how it has changed with his change in activity, but I will empirically stop his Zetia at this time.  I will send off a cholesterol profile.      Basic metabolic profile was checked in November.  Creatinine is 1.46, giving him a GFR of 48.  Electrolytes are within normal limits.      He does have a mild amount of peripheral edema that is likely dependent edema and contributed to by his 2.5 mg of amlodipine.  I will continue his current medical regimen as is.      It appears he is on enoxaparin for DVT prophylaxis.      We will follow up on his lipid profile.  Otherwise, I will have him follow up with my [facility] in 6 months.  I will see him back in a year.  If his blood pressure should stay high, we can help address this.         AMARILYS CAMPOS MD, [facility]             D: 2020   T: 2020   MT: [id number]      Name:     TIFFANY LUCERO   MRN:      [id number]        Account:      [id number]   :      1950           Service Date: 2020      Document: [id number]     steady